# Patient Record
Sex: MALE | Race: WHITE
[De-identification: names, ages, dates, MRNs, and addresses within clinical notes are randomized per-mention and may not be internally consistent; named-entity substitution may affect disease eponyms.]

---

## 2019-01-08 ENCOUNTER — HOSPITAL ENCOUNTER (EMERGENCY)
Dept: HOSPITAL 97 - ER | Age: 74
Discharge: HOME | End: 2019-01-08
Payer: COMMERCIAL

## 2019-01-08 DIAGNOSIS — Z79.01: ICD-10-CM

## 2019-01-08 DIAGNOSIS — M25.532: ICD-10-CM

## 2019-01-08 DIAGNOSIS — Z86.73: ICD-10-CM

## 2019-01-08 DIAGNOSIS — M25.511: Primary | ICD-10-CM

## 2019-01-08 PROCEDURE — 99284 EMERGENCY DEPT VISIT MOD MDM: CPT

## 2019-01-08 NOTE — ER
Nurse's Notes                                                                                     

 [facility]                                                                

Name: Christiano Ruby                                                                             

Age: 73 yrs                                                                                       

Sex: Male                                                                                         

: 1945                                                                                   

MRN: [id number]                                                                                   

Arrival Date: 2019                                                                          

Time: 18:57                                                                                       

Account#: [id number]                                                                            

Bed 7                                                                                             

Private MD: Jack Cabral R                                                                       

Diagnosis: Pain in left wrist;Pain in right shoulder                                              

                                                                                                  

Presentation:                                                                                     

                                                                                             

18:59 Presenting complaint: Patient states: "I have been over active working and my left      jd3 

      wrist is hurting me. It is tender and swelling.". Transition of care: patient was not       

      received from another setting of care. Onset of symptoms was 2019. Risk         

      Assessment: Do you want to hurt yourself or someone else? Patient reports no desire to      

      harm self or others. Initial Sepsis Screen: Does the patient meet any 2 criteria? No.       

      Patient's initial sepsis screen is negative. Does the patient have a suspected source       

      of infection? No. Patient's initial sepsis screen is negative. Care prior to arrival:       

      None.                                                                                       

18:59 Method Of Arrival: Ambulatory                                                           jd3 

18:59 Acuity: MARK 4                                                                           jd3 

                                                                                                  

Historical:                                                                                       

- Allergies:                                                                                      

19:03 No Known Allergies;                                                                     jd3 

- Home Meds:                                                                                      

19:03 warfarin 10 mg Oral tab [Active]; Tramadol Oral [Active];                               jd3 

- PMHx:                                                                                           

19:03 CVA;                                                                                    jd3 

- PSHx:                                                                                           

19:03 right lung;                                                                             jd3 

                                                                                                  

- Immunization history:: Adult Immunizations up to date.                                          

- Social history:: Smoking status: Patient/guardian denies using tobacco.                         

- Ebola Screening: : Patient negative for fever greater than or equal to 101.5 degrees            

  Fahrenheit, and additional compatible Ebola Virus Disease symptoms.                             

                                                                                                  

                                                                                                  

Screenin:14 Abuse screen: Denies threats or abuse. Denies injuries from another. Nutritional        ca1 

      screening: No deficits noted. Tuberculosis screening: No symptoms or risk factors           

      identified. Fall Risk None identified.                                                      

                                                                                                  

Assessment:                                                                                       

19:14 General: Appears in no apparent distress. Behavior is calm, cooperative, appropriate    ca1 

      for age. Pain: Complains of pain in left wrist and right shoulder. Pain currently is 8      

      out of 10 on a pain scale. Neuro: Level of Consciousness is awake, alert, obeys             

      commands, Oriented to person, place, time, situation. Cardiovascular: Heart tones S1 S2     

      present Capillary refill < 3 seconds Patient's skin is warm and dry. Respiratory:           

      Airway is patent Trachea midline Respiratory effort is even, unlabored, Respiratory         

      pattern is regular, symmetrical, Breath sounds are clear bilaterally. GI: Abdomen is        

      flat, non-distended, Bowel sounds present X 4 quads. Abd is soft and non tender X 4         

      quads. : No signs and/or symptoms were reported regarding the genitourinary system.       

      EENT: No signs and/or symptoms were reported regarding the EENT system. Derm: Skin is       

      intact, Skin is pink, warm \T\ dry. Musculoskeletal: Circulation, motion, and sensation     

      intact. Capillary refill < 3 seconds.                                                       

20:03 Reassessment: Patient appears in no apparent distress at this time. Patient and/or      ca1 

      family updated on plan of care and expected duration. Pain level reassessed. Patient is     

      alert, oriented x 3, equal unlabored respirations, skin warm/dry/pink. X-ray at bedside.    

21:00 Reassessment: Patient appears in no apparent distress at this time. Patient and/or      ca1 

      family updated on plan of care and expected duration. Pain level reassessed. Patient is     

      alert, oriented x 3, equal unlabored respirations, skin warm/dry/pink. Ace wrap done.       

      Refused to put on the sling at this time. Said he'd drop by some place to eat, but will     

      put after with daughter's help. Showed him how to put the sling properly.                   

                                                                                                  

Vital Signs:                                                                                      

19:03  / 101; Pulse 99; Resp 18; Temp 97.1; Pulse Ox 98% ; Weight 99.79 kg; Height 6    jd3 

      ft. 2 in. (187.96 cm); Pain 7/10;                                                           

20:03  / 80; Pulse 87; Resp 18; Pulse Ox 98% on R/A;                                    ca1 

19:03 Body Mass Index 28.25 (99.79 kg, 187.96 cm)                                             jd3 

                                                                                                  

ED Course:                                                                                        

18:57 Patient arrived in ED.                                                                  rg4 

18:58 Jack Cabral MD is Private Physician.                                                  rg4 

19:00 Triage completed.                                                                       jd3 

19:04 Arm band placed on.                                                                     jd3 

19:13 Leslie Mckeon, RN is Primary Nurse.                                                      ca1 

19:14 Patient has correct armband on for positive identification. Bed in low position. Call   ca1 

      light in reach. Side rails up X 1. Pulse ox on. NIBP on.                                    

19:21 Rosanna Rivera FNP-C is PHCP.                                                        kb  

19:21 Derek Lew MD is Attending Physician.                                             kb  

20:16 X-ray completed. Portable x-ray completed in exam room. Patient tolerated procedure     bb2 

      well.                                                                                       

20:16 Shoulder Right (2 View) XRAY In Process Unspecified.                                    EDMS

20:16 Wrist Left (2 View) XRAY In Process Unspecified.                                        EDMS

21:00 No provider procedures requiring assistance completed. Ace wrap to left wrist by        LASHONDA Gasca.                                                                              

21:05 Patient did not have IV access during this emergency room visit.                        ca1 

                                                                                                  

Administered Medications:                                                                         

19:32 CANCELLED (Duplicate Order): Norco (7.5 mg-325 mg) 1 tabs PO once                       kb  

19:44 Drug: Norco (7.5 mg-325 mg) 1 tabs Route: PO;                                           ca1 

21:09 Follow up: Response: No adverse reaction; Pain is decreased                             ca1 

                                                                                                  

                                                                                                  

Outcome:                                                                                          

20:55 Discharge ordered by MD.                                                                kb  

21:05 Discharged to home ambulatory, with family.                                             ca1 

21:05 Condition: stable                                                                           

21:05 Discharge instructions given to patient, Instructed on discharge instructions, follow       

      up and referral plans. medication usage, Demonstrated understanding of instructions,        

      follow-up care, medications, Prescriptions given X 1.                                       

21:14 Patient left the ED.                                                                    ca1 

                                                                                                  

Signatures:                                                                                       

Dispatcher [employer]                           EDMS                                                 

Rosanna Rivera FNP-C FNP-Ckb Garcia, Rubi                                 rg4                                                  

Hardeep Vargas RN                    RN   jSanjuana Salter                               bb2                                                  

Leslie Mckeon RN                        RN   ca1                                                  

                                                                                                  

**************************************************************************************************

## 2019-01-08 NOTE — RAD REPORT
EXAM DESCRIPTION:  RAD - Wrist Left 2 View - 1/8/2019 8:16 pm

 

CLINICAL HISTORY:   Left wrist pain

 

FINDINGS:  No  fracture or dislocation is seen.

 

8 millimeter well-circumscribed lucency with a sclerotic border is present within the distal ulna whi
ch appears benign

## 2019-01-08 NOTE — EDPHYS
Physician Documentation                                                                           

 [facility]                                                                

Name: Christiano Ruby                                                                             

Age: 73 yrs                                                                                       

Sex: Male                                                                                         

: 1945                                                                                   

MRN: [id number]                                                                                   

Arrival Date: 2019                                                                          

Time: 18:57                                                                                       

Account#: [id number]                                                                            

Bed 7                                                                                             

Private MD: Jack Cabral R ED Physician Derek Lew                                                                      

HPI:                                                                                              

                                                                                             

20:25 This 73 yrs old  Male presents to ER via Ambulatory with complaints of Hand    kb  

      Swelling, Shoulder Pain.                                                                    

20:26 The patient or guardian reports pain, swelling, tenderness. The complaints affect the   kb  

      left wrist diffusely. Context: The problem was sustained at home, resulted from lifting     

      or pulling. Onset: The symptoms/episode began/occurred yesterday. Modifying factors:        

      The symptoms are alleviated by nothing, the symptoms are aggravated by nothing.             

      Associated signs and symptoms: The patient has no apparent associated signs or              

      symptoms. The patient has not experienced similar symptoms in the past. The patient has     

      not recently seen a physician. Pt reports left wrist and right shoulder pain that           

      started yesterday after moving furniture.                                                   

                                                                                                  

Historical:                                                                                       

- Allergies:                                                                                      

19:03 No Known Allergies;                                                                     jd3 

- Home Meds:                                                                                      

19:03 warfarin 10 mg Oral tab [Active]; Tramadol Oral [Active];                               jd3 

- PMHx:                                                                                           

19:03 CVA;                                                                                    jd3 

- PSHx:                                                                                           

19:03 right lung;                                                                             jd3 

                                                                                                  

- Immunization history:: Adult Immunizations up to date.                                          

- Social history:: Smoking status: Patient/guardian denies using tobacco.                         

- Ebola Screening: : Patient negative for fever greater than or equal to 101.5 degrees            

  Fahrenheit, and additional compatible Ebola Virus Disease symptoms.                             

                                                                                                  

                                                                                                  

ROS:                                                                                              

20:24 Constitutional: Negative for fever, chills, and weight loss, Cardiovascular: Negative   kb  

      for chest pain, palpitations, and edema, Respiratory: Negative for shortness of breath,     

      cough, wheezing, and pleuritic chest pain, Abdomen/GI: Negative for abdominal pain,         

      nausea, vomiting, diarrhea, and constipation, Skin: Negative for injury, rash, and          

      discoloration, Neuro: Negative for headache, weakness, numbness, tingling, and seizure.     

20:24 MS/extremity: Positive for pain, swelling, tenderness, of the anterior aspect of right      

      shoulder and left wrist.                                                                    

                                                                                                  

Exam:                                                                                             

20:24 Constitutional:  This is a well developed, well nourished patient who is awake, alert,  kb  

      and in no acute distress. Head/Face:  Normocephalic, atraumatic. Chest/axilla:  Normal      

      chest wall appearance and motion.  Nontender with no deformity.  No lesions are             

      appreciated. Cardiovascular:  Regular rate and rhythm with a normal S1 and S2.  No          

      gallops, murmurs, or rubs.  Normal PMI, no JVD.  No pulse deficits. Respiratory:  Lungs     

      have equal breath sounds bilaterally, clear to auscultation and percussion.  No rales,      

      rhonchi or wheezes noted.  No increased work of breathing, no retractions or nasal          

      flaring. Abdomen/GI:  Soft, non-tender, with normal bowel sounds.  No distension or         

      tympany.  No guarding or rebound.  No evidence of tenderness throughout. Skin:  Warm,       

      dry with normal turgor.  Normal color with no rashes, no lesions, and no evidence of        

      cellulitis. Neuro:  Awake and alert, GCS 15, oriented to person, place, time, and           

      situation.  Cranial nerves II-XII grossly intact.  Motor strength 5/5 in all                

      extremities.  Sensory grossly intact.  Cerebellar exam normal.  Normal gait.                

20:24 Musculoskeletal/extremity: Extremities: grossly normal except: noted in the anterior        

      aspect of right shoulder and left wrist: pain, swelling, tenderness, ROM: intact in all     

      extremities, Circulation is intact in all extremities. Sensation intact.                    

                                                                                                  

Vital Signs:                                                                                      

19:03  / 101; Pulse 99; Resp 18; Temp 97.1; Pulse Ox 98% ; Weight 99.79 kg; Height 6    jd3 

      ft. 2 in. (187.96 cm); Pain 7/10;                                                           

20:03  / 80; Pulse 87; Resp 18; Pulse Ox 98% on R/A;                                    ca1 

19:03 Body Mass Index 28.25 (99.79 kg, 187.96 cm)                                             jd3 

                                                                                                  

MDM:                                                                                              

19:21 Patient medically screened.                                                             kb  

20:25 Data reviewed: vital signs, nurses notes. Data interpreted: Pulse oximetry: on room air kb  

      is 98 %. Interpretation: normal.                                                            

20:51 Counseling: I had a detailed discussion with the patient and/or guardian regarding: the kb  

      historical points, exam findings, and any diagnostic results supporting the                 

      discharge/admit diagnosis, radiology results, the need for outpatient follow up, a          

      family practitioner, a orthopedic surgeon, to return to the emergency department if         

      symptoms worsen or persist or if there are any questions or concerns that arise at home.    

                                                                                                  

                                                                                             

19:30 Order name: Shoulder Right (2 View) XRAY; Complete Time: 20:51                          kb  

                                                                                             

19:30 Order name: Wrist Left (2 View) XRAY; Complete Time: 20:45                              kb  

                                                                                             

20:55 Order name: Sling; Complete Time: 21:09                                                 kb  

                                                                                             

20:55 Order name: Ace Wrap; Complete Time: 21:08                                              kb  

                                                                                                  

Administered Medications:                                                                         

19:32 CANCELLED (Duplicate Order): Norco (7.5 mg-325 mg) 1 tabs PO once                       kb  

19:44 Drug: Norco (7.5 mg-325 mg) 1 tabs Route: PO;                                           ca1 

21:09 Follow up: Response: No adverse reaction; Pain is decreased                             ca1 

                                                                                                  

                                                                                                  

Disposition:                                                                                      

                                                                                             

07:51 Co-signature as Attending Physician, Derek Lew MD I agree with the assessment and  harriett 

      plan of care.                                                                               

                                                                                                  

Disposition:                                                                                      

19 20:55 Discharged to Home. Impression: Pain in left wrist, Pain in right shoulder.        

- Condition is Stable.                                                                            

- Discharge Instructions: Shoulder Pain, Easy-to-Read, Wrist Pain, Easy-to-Read,                  

  Arthritis, Easy-to-Read.                                                                        

- Prescriptions for Tylenol- Codeine #3 300-30 mg Oral Tablet - take 2 tablets by ORAL            

  route every 6 hours As needed; 14 tablet.                                                       

- Medication Reconciliation Form, Thank You Letter, Antibiotic Education, Prescription            

  Opioid Use form.                                                                                

- Follow up: Emergency Department; When: As needed; Reason: Worsening of condition.               

  Follow up: Private Physician; When: 2 - 3 days; Reason: Recheck today's complaints,             

  Continuance of care, Re-evaluation by your physician.                                           

                                                                                                  

                                                                                                  

                                                                                                  

Signatures:                                                                                       

Dispatcher [employer]                           EDMS                                                 

Rosanna Rivera, FNP-C                 ERICP-Derek Higuera MD MD cha Davies, Jonathon, RN                    RN   Leslie Peterson RN                        RN   ca1                                                  

                                                                                                  

Corrections: (The following items were deleted from the chart)                                    

                                                                                             

19:32 19:32 Norco (7.5 mg-325 mg) 1 tabs PO once ordered. ca1                                 kb  

21:14 20:55 2019 20:55 Discharged to Home. Impression: Pain in left wrist; Pain in      ca1 

      right shoulder. Condition is Stable. Forms are Medication Reconciliation Form, Thank        

      You Letter, Antibiotic Education, Prescription Opioid Use. Follow up: Emergency             

      Department; When: As needed; Reason: Worsening of condition. Follow up: Private             

      Physician; When: 2 - 3 days; Reason: Recheck today's complaints, Continuance of care,       

      Re-evaluation by your physician. kb                                                         

                                                                                                  

**************************************************************************************************

## 2019-01-08 NOTE — RAD REPORT
EXAM DESCRIPTION:  RAD - Shoulder  Right 2 View - 1/8/2019 8:16 pm

 

CLINICAL HISTORY:  Right shoulder pain

 

FINDINGS:  No fracture or dislocation is seen. Moderate osteoarthritis involves the glenohumeral join
t consisting joint space narrowing and osteophytes.

 

Mild osteoarthritis involves the AC joint

## 2020-07-18 NOTE — XMS REPORT
Continuity of Care Document

                            Created on:2020



Patient:BRENDA BRADSHAW

Sex:Male

:1945

External Reference #:[id number]





Demographics







                          Address                   [address]



                                                    [place] [address]

 

                          Home Phone                [phone]

 

                          Email Address             [place]

 

                          Preferred Language        en

 

                          Marital Status            Unknown

 

                          Anabaptism Affiliation     Unknown

 

                          Race                      Unknown

 

                          Ethnic Group              Unknown









Author







                          Organization              [facility]

t

 

                          Address                   [address]



                                                    [place] [address]

 

                          Phone                     [phone]









Care Team Providers







                    Name                Role                Phone

 

                    Unavailable         Unavailable         Unavailable









Problems







       Condition Condition Condition Status Onset  Resolution Last   Treating Co

mments 

Source



       Name   Details Category        Date   Date   Treatment Clinician        



                                                 Date                 

 

       Cerebrovas Cerebrovas Problem Active                                    [facility]

[facility]



       cular  cular                                                   [facility]



       accident accident                                                  [facility]

a



       (CVA), (CVA),                                                  l



       unspecifie unspecifie                                                  Ou

tpati



       d      d                                                       ent



       mechanism mechanism                                                  Clin

ics

 

       Hemiparesi Hemiparesi Problem Active                                    C

[facility]



       s of right s of right                                                  Sarah

kes -



       dominant dominant                                                  [facility]

a



       side as side as                                                  l



       late   late                                                    [facility]pat[facility]



       effect of effect of                                                  ent



       cerebrovas cerebrovas                                                  Cl

inics



       cular  cular                                                   



       disease, disease,                                                  



       unspecifie unspecifie                                                  



       d      d                                                       



       cerebrovas cerebrovas                                                  



       cular  cular                                                   



       disease disease                                                  



       type   type                                                    

 

       Dermatocha Dermatocha Problem Active                                    C

[facility]



       lasis of lasis of                                                  [facility] 

-



       right  right                                                   [facility]



       upper  upper                                                   l



       eyelid eyelid                                                  [facility]



                                                                      ent



                                                                      Clinics

 

       Dermatocha Dermatocha Problem Active                                    C

[facility]



       lasis of lasis of                                                  [facility] 

-



       left upper left upper                                                  Me

moria



       eyelid eyelid                                                  l



                                                                      Outpati



                                                                      ent



                                                                      Clinics

 

       Essential Essential Problem Active                                    [facility]



       hypertensi hypertensi                                                  Sarah

[facility] -



       on     on                                                      [facility]



                                                                      [facility]



                                                                      [facility]

 

       Long-term Long-term Problem Active                                    [facility]



       (current) (current)                                                  [facility]

s -



       use of use of                                                  [facility]



       anticoagul anticoagul                                                  l



       ants, INR ants, INR                                                  Outp

ati



       goal   goal                                                    ent



       2.0-3.0 2.0-3.0                                                  Clinics

 

       Tobacco Tobacco Problem Active                                    [facility]



       use    use                                                     [facility]



       disorder, disorder,                                                  [facility]

arian



       continuous continuous                                                  l



                                                                      Outpati



                                                                      ent



                                                                      Clinics

 

       Primary Primary Problem Active                                    [facility]



       osteoarthr osteoarthr                                                  Sarah

kes -



       itis of itis of                                                  [facility]



       right knee right knee                                                  l



                                                                      Outpati



                                                                      ent



                                                                      Clinics







Allergies, Adverse Reactions, Alerts

This patient has no known allergies or adverse reactions.



Medications







       Ordered Filled Start  Stop   Current Ordering Indication Dosage Frequency

 Signature

                    Comments            Components          Source



     Medication Medication Date Date Medication? Clinician                (SIG) 

          



     Name Name                                                   

 

     Eliquis Eliquis       Yes  Markus                take 1 tab          

 [facility]



               5-18           Lowe                               [facility]



               00:00:                                              [facility]



               00                                                l



                                                                 Outpati



                                                                 ent



                                                                 Clinics

 

     Warfarin Warfarin           Yes  Markus                2 tablet           C

[facility]



     Sodium Sodium                Lowe                               [facility]



                                                                 [facility]



                                                                 l



                                                                 Outpati



                                                                 ent



                                                                 Clinics

 

     Tramadol Tramadol           Yes  Markus                1 tablet           C

[facility]



     HCl  HCl                 Lowe                as needed           [facility]



                                                                 [facility]



                                                                 l



                                                                 Outpati



                                                                 ent



                                                                 Clinics

 

     Lisinopril/ Lisinopril/           Yes  Markus                one tablet    

       [facility]



     HCTZ HCTZ                Lowe                               [facility]



                                                                 [facility]



                                                                 l



                                                                 Outpati



                                                                 ent



                                                                 Clinics

 

     Centrum Centrum           Yes  Markus                as             [facility]



     Silver Silver                Lowe                directed           [facility] 

-



                                                                 [facility]



                                                                 l



                                                                 Outpati



                                                                 ent



                                                                 Clinics

 

     Xyzal Xyzal           Yes  Markus                not            [facility]



                              Lowe                defined           [facility]



                                                                 [facility]



                                                                 l



                                                                 Outpati



                                                                 ent



                                                                 Clinics







Procedures

This patient has no known procedures.



Encounters







        Start   End     Encounter Admission Attending Care    Care    Encounter 

Source



        Date/Time Date/Time Type    Type    Clinicians Facility Department ID   

   

 

        2020 Outpatient                 [facility]

[id number] [facility]



        10:21:00 10:21:00                         [facility]



                                                Medicine                 Outpati



                                                                        ent



                                                                        Clinics

 

        2020 Outpatient                 [facility]

[id number] [facility]



        10:30:00 10:30:00                          [employer]         [facility]



                                                Medicine                 Outpati



                                                                        ent



                                                                        Clinics

 

        2020-04-10 2020-04-10 Outpatient                 [facility]

[id number] [facility]



        10:30:00 10:30:00                          [employer]         [facility]



                                                Medicine                 Outpati



                                                                        ent



                                                                        Clinics







Results

This patient has no known results.

## 2020-07-18 NOTE — XMS REPORT
[facility]

                             Created on:May 21, 2020



Patient:Christiano Ruby

Sex:Male

:1945

External Reference #:[id number]





Demographics







                          Address                   [address] [place] [id number]

 

                          Phone                     [phone]

 

                          Preferred Language        en

 

                          Marital Status            Unknown

 

                          [employer] Affiliation     Unknown

 

                          Race                      Unknown

 

                          Ethnic Group              Unknown









Author







                          Organization              [facility]









Care Team Providers







                    Name                Role                Phone

 

                    Markus Lowe       Provider Role       Unavailable









Allergies

No Known Allergies



Problems







             Problem Type Condition    Code         Onset Dates  Condition Statu

s

 

             Assessment   Long-term (current) use of Z79.01                    A

ctive



                          anticoagulants, INR goal 2.0-3.0                      

     

 

             Problem      Hemiparesis of right dominant side I69.951            

       Active



                          as late effect of cerebrovascular                     

      



                          disease, unspecified                           



                          cerebrovascular disease type                          

 

 

             Problem      Dermatochalasis of right upper H02.831                

   Active



                          eyelid                                 

 

             Problem      Dermatochalasis of left upper H02.834                 

  Active



                          eyelid                                 

 

             Problem      Essential hypertension I10                       Activ

e

 

             Problem      Long-term (current) use of Z79.01                    A

ctive



                          anticoagulants, INR goal 2.0-3.0                      

     

 

             Problem      Tobacco use disorder, continuous F17.209              

     Active

 

             Problem      Primary osteoarthritis of right M17.11                

    Active



                          knee                                   

 

             Problem      Cerebrovascular accident (CVA), I63.9                 

    Active



                          unspecified mechanism                           







Medications

No Known Medications



Results

No Known Results



Summary Purpose

[facility] Submission

## 2020-07-18 NOTE — ER
Nurse's Notes                                                                                     

 [facility]                                                                 

Name: Christiano Ruby                                                                             

Age: 74 yrs                                                                                       

Sex: Male                                                                                         

: 1945                                                                                   

MRN: [id number]                                                                                   

Arrival Date: 2020                                                                          

Time: 15:31                                                                                       

Account#: [id number]                                                                            

Bed Waiting                                                                                       

Private MD:                                                                                       

Diagnosis:                                                                                        

                                                                                                  

Presentation:                                                                                     

                                                                                             

15:31 Chief complaint: Patient states: heartburn x 2 days. Has tried Omeprazole OTC with no   sv  

      relief. Denies SOB or CP. Coronavirus screen: Patient denies a cough. Patient denies        

      shortness of breath or difficulty breathing. Patient denies measured and/or subjective      

      temperature greater than 100.4F prior to today's visit. Patient denies travel on a          

      cruise ship or to a country the [facility] currently lists as an affected area. Patient denies     

      contact with known and/or suspected case of COVID-19. Proceed with normal triage. Ebola     

      Screen: No symptoms or risks identified at this time. Risk Assessment: Do you want to       

      hurt yourself or someone else? Patient reports no desire to harm self or others. Onset      

      of symptoms was 2020.                                                              

15:31 Method Of Arrival: Wheelchair                                                           sv  

15:31 Acuity: MARK 4                                                                           sv  

15:33 Initial Sepsis Screen: Does the patient meet any 2 criteria? No. Patient's initial      sv  

      sepsis screen is negative. Does the patient have a suspected source of infection? No.       

      Patient's initial sepsis screen is negative.                                                

                                                                                                  

Historical:                                                                                       

- Allergies:                                                                                      

15:33 No Known Allergies;                                                                     sv  

- PMHx:                                                                                           

15:33 CVA;                                                                                    sv  

- PSHx:                                                                                           

15:33 right lung;                                                                             sv  

                                                                                                  

- Immunization history:: Adult Immunizations.                                                     

- Social history:: Smoking status: Patient reports the use of cigarette tobacco                   

  products, smokes one-half pack cigarettes per day.                                              

                                                                                                  

                                                                                                  

Vital Signs:                                                                                      

15:33  / 66; Pulse 68; Resp 20; Temp 99.2; Pulse Ox 99% ; Weight 104.33 kg; Height 6    sv  

      ft. 2 in. (187.96 cm);                                                                      

15:33 Body Mass Index 29.53 (104.33 kg, 187.96 cm)                                            sv  

                                                                                                  

ED Course:                                                                                        

15:31 Patient arrived in ED.                                                                  sv  

15:32 Triage completed.                                                                       sv  

15:33 Arm band placed on.                                                                     sv  

16:40 Patient's name was called from ER lobby. No response.                                   sv  

16:50 Patient's name was called from ER lobby. No response.                                   sv  

17:05 Patient's name was called from ER lobby. No response.                                   sv  

                                                                                                  

Administered Medications:                                                                         

No medications were administered                                                                  

                                                                                                  

                                                                                                  

Outcome:                                                                                          

17:26 Patient left the ED.                                                                    sv  

                                                                                                  

Signatures:                                                                                       

Adela Acevedo RN                    RN   sv                                                   

                                                                                                  

**************************************************************************************************

## 2020-07-18 NOTE — XMS REPORT
[facility]

                             Created on:May 18, 2020



Patient:Christiano Ruby

Sex:Male

:1945

External Reference #:[id number]





Demographics







                          Address                   [address] [place] [address]



                                                    [place] [address]

 

                          Phone                     [phone]

 

                          Preferred Language        en

 

                          Marital Status            Unknown

 

                          Shinto Affiliation     Unknown

 

                          Race                      Unknown

 

                          Ethnic Group              Unknown









Author







                          Organization              eClinicalWorks









Care Team Providers







                    Name                Role                Phone

 

                    [employer]       Provider Role       Unavailable









Allergies, Adverse Reactions, Alerts







                    Substance           Reaction            Event Type

 

                    N.K.D.A.            Info Not Available  Non Drug Allergy







Problems







             Problem Type Condition    Code         Onset Dates  Condition Statu

s

 

             Assessment   Cerebrovascular accident (CVA), I63.9                 

    Active



                          unspecified mechanism                           

 

             Problem      Hemiparesis of right dominant side I69.951            

       Active



                          as late effect of cerebrovascular                     

      



                          disease, unspecified                           



                          cerebrovascular disease type                          

 

 

             Problem      Dermatochalasis of right upper H02.831                

   Active



                          eyelid                                 

 

             Problem      Dermatochalasis of left upper H02.834                 

  Active



                          eyelid                                 

 

             Problem      Essential hypertension I10                       Activ

e

 

             Problem      Long-term (current) use of Z79.01                    A

ctive



                          anticoagulants, INR goal 2.0-3.0                      

     

 

             Problem      Tobacco use disorder, continuous F17.209              

     Active

 

             Problem      Primary osteoarthritis of right M17.11                

    Active



                          knee                                   

 

             Problem      Cerebrovascular accident (CVA), I63.9                 

    Active



                          unspecified mechanism                           

 

             Assessment   Chronic, continuous use of opioids F11.90             

       Active

 

             Assessment   Primary osteoarthritis of right M17.11                

    Active



                          knee                                   

 

             Assessment   Tobacco use disorder, continuous F17.209              

     Active

 

             Assessment   Hemiparesis of right dominant side I69.951            

       Active



                          as late effect of cerebrovascular                     

      



                          disease, unspecified                           



                          cerebrovascular disease type                          

 

 

             Assessment   Long-term (current) use of Z79.01                    A

ctive



                          anticoagulants, INR goal 2.0-3.0                      

     

 

             Assessment   Essential hypertension I10                       Activ

e







Medications







        Medication Code    Code    Instructions Start   End     Status  Dosage



                System                  Date    Date            

 

        Eliquis NDC     [id number] 5 MG Orally BID May 18,         Active  take

 1 tab



                                        2020                    

 

        Centrum Silver ND     [id number] - Orally                 Active  as d

irected

 

        Lisinopril/HCT NDC     [id number] 20/12.5 PO Q                 Active  

one tablet



        Z                       daily                           

 

        Tramadol HCl NDC     [id number] 50 MG Orally                 Active  1 

tablet as



                                BID PRN Pain                         needed

 

        Xyzal   NDC     0                               Active  not defined

 

        Warfarin NDC     [id number] 5 MG Orally                 Inactive 2 tabl

et



        Sodium                  Once a day                         







Results

No Known Results



Summary Purpose

eClinicalWorks Submission

## 2020-09-10 NOTE — R.PREADM
PRE-ADMISSION SCREENING FORM



SCREENING DATE AND TIME



2020 15:51 (CDT) 



ANTICIPATED REHAB ADMISSION DATE



09/10/2020 



REFERRING FACILITY



[facility] 



REFERRAL DATE AND TIME



2020 15:51 (CDT) 



ACUTE ADMIT DATE



2020 





Previous Rehabilitation(s):





No.



ACUTE /DC PLANNER



deepak 



ATTENDING PHYSICIAN



SOTO LONG 



REFERRING PHYSICIAN



DR.HASAM PARRA 



REHAB FACILITY



[facility] 



CLINICAL LIAISON



Naun De León 



PHYSICIAN REVIEWER



Dr. Michael Salinas M.D. 



MR#



[id number] 



ACCT#



[id number] 



NAME



CHRISTIANO RUBY



ADDRESS



[address] 



[facility] 



PHONE



[phone] 



[facility]



[id number] 



DATE OF BIRTH



1945 



AGE



74 



SSN#



[id number] 



GENDER



male 



MARITAL STATUS



 



RACE



unknown race 



ADMIT FROM



02 - [facility] 



PRE-HOSPITAL LIVING SETTING



01 - Home (private home/apt. board/care, assisted living, group home, transitional living) 



HOME TYPE AND DETAILS



Type of home: single family house

# of levels in the residence: 1

# of steps within the residence: 0

# of steps to enter the residence: 0



PRE-HOSPITAL LIVING WITH



Alone 



FAMILY SUPPORT



No 



PRIMARY FAMILY CONTACT NAME



VENESSA RUBY 



PRIMARY FAMILY CONTACT PHONE



[phone] 



PRIMARY FAMILY CONTACT RELATIONSHIP



Son 



PHONE PRIMARY FAMILY CONTACT ON ADM.?



no 



IS PRIMARY FAMILY CONTACT AUTH. REP.?



no 



1ST EMERGENCY CONTACT



VENESSA RUBY 



1ST CONTACT PHONE



[phone] 



1ST CONTACT RELATIONSHIP



Son 



PHONE 1ST CONTACT ON ADM.



no 



IS 1ST CONTACT AUTH. REP.?



no 



PHONE 2ND CONTACT ON ADM.?



no 



PATIENT EMPLOYMENT STATUS



Retired (for age) 



PATIENT EMPLOYER



No Employer 



PAYOR INFORMATION:



1ST PAYOR NAME



[facility] 



1ST PAYOR PHONE



[phone] 



1ST PAYOR POLICY ID



[id number] 



INJURY/ILLNESS DUE TO ACCIDENT?



No 



ANOTHER PARTY RESPONSIBLE?



No 



PRIMARY REHAB/ACUTE DIAGNOSIS:



STROKE



ONSET DATE



2020



REHAB IMPAIRMENT CATEGORY (CLARISSA):



01 Stroke (STR) MEETS 60% rule



AFFECTED EXTREMITIES:



RLE, and RUE



PRIMARY DIAGNOSIS-RELATED SURGERIES:



No surgeries related to the primary diagnosis were performed.



SUMMARY OF ACUTE HOSPITALIZATION:



Pt. is a 73 yo Right-handed male of unknown race.

On 2020 Pt. presented to [facility] with sudden onset of right-side weakness.

On 2020 he was admitted to [facility] with diagnosis STROKE.

His impairment category is Stroke 01 -  Right Body (Left Brain) (01.2).

Pre-morbidly, Pt. was independent/mod-I in Locomotion, Safety Awareness, Social Cognition, Balance, T
ransfers Control, Self-Care, and Communication; and he had good Sphincter Control and Endurance.

Currently, he has deficits of Locomotion, Balance, Transfers Control, Sphincter Control, and Enduranc
e.

Pt. is now referred to [facility] for acute in-patient rehabilitation in order
 to maximize patient's functional independence in activities of daily living, strength, ROM, and mobi
lity.

Patient has realistic goal of being discharged at assistance level 7-Ind to reside at Home with  Pt s
elf.

Christiano Ruby is a 74 year old male that lives independently

at home in his one story home. He has no stairs to get into his house. He was able

to do daily activated on his own with difficulty prior. He has a history of prior

CVA in  who presents with worsening of baseline right sided

weakness/numbness in leg/arm beginning on the morning of - he fell out of

his chair due to weakness. Previously able to ambulate without difficulty. Mr. Ruby has done all of his own caring, Cooking and laundry. He performs all his own ADLs and IA
DLs. Prior

to COVID he was seeing her PCP regularly. He would most definitely benefit from

acute inpatient rehab and has become severely debilitated and unable to live at

his prior level of activity at home Getting her stronger and better to be back

living at home independently is our goal. It is reasonable and necessary for

the patient to come to acute inpatient rehab for approximately 7-10 days in

order to return to his prior level of care. He is now being transferred to [facility] Inpatient rehabilitation and is medically stable with

relatively stable labs. He is now medically stable but in need of 24  hour

nursing, doctor supervision and oversight while receiving active and ongoing



participate in 3 hours of therapy a day/15 hours per week and receive care with

intensive interdisciplinary approach. COVID-19 screening performed; spoke with

patient via phone. Patient denies new onset of fever, cough, difficulty

breathing, sore throat, body aches and non-allergy nasal congestion in the past

24 hours. Patient denies travel outside of [place] in the past 14 days. Patient

denies any contact with someone who has a confirmed diagnosis of or is under

investigation for COVID-19 in the past 14 days. Patient has been tested negative

for COVID- 19.



PAST MEDICAL HISTORY



ACUTE CVA 2010

HTN

TABACCO

PMHx



MEDICATION ALLERGIES:



No Known Drug Allergies (NKDA)



ENVIRONMENTAL ALLERGIES:



- Substance Allergies

None Known

- Other Allergies

None Known



CODE STATUS:



Full code



WEIGHT/HEIGHT/BMI:



WEIGHT



215 lbs 



HEIGHT



6' 3" 



BMI



26.9 



DIET:



- Diet Type

Regular

- Diet - Solid Texture

Regular

- Diet - Liquid Texture

Regular

- Tube Feed

N/A



REVIEW OF SYSTEMS:



- Gen

Alert and awake

Lying in bed

No apparent distress

Oriented to: person, time, and place

- Vital Signs

Temperature: 97.0 F

SBP/DBP: 139/64

Pulse: 75

Resp: 20

Vital signs stable, afebrile

- CVS

RRR



VITAL SIGNS



Temperature: 97.0 F

SBP/DBP: 139/64

Pulse: 75

Resp: 20

Vital signs stable, afebrile



MEDICATIONS/TREATMENT:



Other-  See attached MAR (Medication Administration Record).



CURRENT SPHINCTER CONTROL:



Pre-hospital bladder status: unspecified

# of bladder accidents in the last 7 days prior to screenin

Pre-hospital bowel status: unspecified

# of bowel accidents in the last 7 days prior to screenin

Last Bowel Movement Date: 2020



CURRENT LOCOMOTION STATUS:



distance walked 0  feet



DETAILED CURRENT FUNCTIONAL STATUS:



- Bladder

accident frequency: Ind - No accidents in the past 7 days

- Bowel

accident frequency: Ind - No accidents in the past 7 days

- Walking

score based on distance walked: 0(N/A)

- Wheelchair

score based on distance traveled: 0(N/A)



QI SCORES:



- Self-Care

A. Eating  03-Partial/moderate assistance  

B. Oral hygiene  02-Substantial/maximal assistance  

C. Toileting hygiene  02-Substantial/maximal assistance  

E. Shower/bathe self  02-Substantial/maximal assistance  

F. Upper body dressing  02-Substantial/maximal assistance  

G. Lower body dressing  02-Substantial/maximal assistance  

H. Putting on/taking off footwear  88-Not attempted due to medical condition or safety concerns  

- Mobility

A. Roll left and right  03-Partial/moderate assistance  

B. Sit to lying  03-Partial/moderate assistance  

C. Lying to sitting on side of bed  03-Partial/moderate assistance  

D. Sit to stand  02-Substantial/maximal assistance  

E. Chair/bed-to-chair transfer  02-Substantial/maximal assistance  

F. Toilet transfer  88-Not attempted due to medical condition or safety concerns  

G. Car transfer  88-Not attempted due to medical condition or safety concerns  

I. Walk 10 feet  88-Not attempted due to medical condition or safety concerns  

J. Walk 50 feet with two turns  88-Not attempted due to medical condition or safety concerns  

K. Walk 150 feet  88-Not attempted due to medical condition or safety concerns  

L. Walking 10 feet on uneven surfaces  88-Not attempted due to medical condition or safety concerns  


M. 1 step (curb)  88-Not attempted due to medical condition or safety concerns  

N. 4 steps  88-Not attempted due to medical condition or safety concerns  

O. 12 steps  88-Not attempted due to medical condition or safety concerns  

P. Picking up object  88-Not attempted due to medical condition or safety concerns  

R. Wheel 50 feet with two turns  88-Not attempted due to medical condition or safety concerns  

S. Wheel 150 feet  88-Not attempted due to medical condition or safety concerns  

- Bladder and Bowel

Bladder continence      

Bowel continence      

- Endurance

Fair

- Balance

Fair

- Safety Awareness

Fair



CURRENT FUNC. DEFICITS:



Endurance, Balance, Self-Care, Safety Awareness, and Mobility



CURRENT / PREVIOUS ASSISTIVE DEVICES:



Hospital Bed

Rolling Walker

Wheelchair





HISTORY OF FALLS. HAS THE PATIENT HAD TWO OR MORE FALLS IN THE PAST YEAR OR ANY FALL WITH INJURY IN T
HE PAST YEAR?:



No 



PRIOR SURGERY. DID THE PATIENT HAVE MAJOR SURGERY DURING  DAYS PRIOR TO ADMISSION?:



No 



THERAPY NOTES FROM ACUTE CARE:



Attached.



SPECIAL NEEDS:



- Safety Concerns

Skin breakdown precautions needed due to skin breakdown risk



PATIENT NEEDS ACTIVE AND ONGOING THERAPEUTIC INTERVENTION OF MULTIPLE THERAPY DISCIPLINES, INCLUDING:




- Occupational Therapy

Cognitive Retraining. Visual Perceptual Training. 



- Dietary and Nutrition

Adequate Nutrition. Nutritional Education. Nutritional Supplements. 



- Speech Therapy

Cognitive Training. Expressive Language Skills. Memory Strategies. Receptive Language Skills. Speech 
Intelligibility Training. 



PATIENT NEEDS CLOSE MEDICAL SUPERVISION BY A REHABILITATION PHYSICIAN FOR:



Coordination of Treatment Team



PATIENT REQUIRES 24X7 REHAB NURSING FOR MEDICAL AND FUNCTIONAL MGT. OF THE FOLLOWING DEFICITS:



Disease Management

Medication Management

Patient/Family Education

Providing Safe Environment



PATIENT REQUIRES INTENSIVE, COORDINATED INTERDISCIPLINARY APPROACH TO REHAB:



Arranging Home Equipment/Services

Discharge Planning

Family Intervention/Training

/Case Management



PATIENT REHAB POTENTIAL:



MIGUELITO RUBY is able and expected to receive 3 hours of individualized therapy daily on at least 5 of e
very 7 days

MIGUELITO Crump prognosis for significant practical improvement within a reasonable period of time appea
rs Good

Expected level of measurable improvement will be of a practical value to MIGUELITO Crump functional capa
city or adaptations to impairments

Has a viable Discharge Plan

Medically appropriate; condition is sufficiently stable to participate in intensive rehab program



DISCHARGE PLAN:



- Estimated Length of Stay (days)

17. 



- Consensus on plan

Discharge plan has been discussed with primary caregiver. Patient/Family is in agreement with the filiberto
n. Primary caregiver is in agreement with the plan. 



- Patient/Family Goals

Return home independently. 



- Planned Living Setting Upon Discharge

Home, to live alone. Transitional Living. Primary caregiver: Pt self. 



RECOMMENDED CARE LEVEL:



IRF



RECOMMENDATION DETAILS:



Recommended Admission to Comprehensive Rehabilitation Program to Increase Functional [place]



SCREENER'S COMPLETENESS CONFIRMATION:



- Screening Confirmation

The patient data collection on this preadmission screening form is finished



PHYSICIANS REVIEW AND ADMISSION DETERMINATION



Admit - Based on my review of the Pre-Admission Screening results, in my medical judgment and experie
nce, I concur with the findings and recommend admission to [facility], as this
 patient requires an IRF level of care.



SIGNATURE PANEL:



 - [electronically] signed by Naun De León on 09/10/2020 at 15:45 (CDT)

 - [electronically] signed by Arlette Dick RN on 09/10/2020 at 15:52 (CDT)

Physician Reviewer - [electronically] signed by Dr. Michael Salinas M.D. on 09/10/2020 at 16:13 (CDT
)

## 2020-09-10 NOTE — XMS REPORT
[facility]

                           Created on:2020



Patient:Christiano Ruby

Sex:Male

:1945

External Reference #:[id number]





Demographics







                          Address                   [address] [place] [address]

 

                          Phone                     [phone]

 

                          Preferred Language        en

 

                          Marital Status            Unknown

 

                          Anabaptism Affiliation     Unknown

 

                          Race                      Unknown

 

                          Ethnic Group              Unknown









Author







                          Organization              eClinicalWorks









Care Team Providers







                    Name                Role                Phone

 

                    Markus Lowe       Provider Role       Unavailable









Allergies

No Known Allergies



Problems







             Problem Type Condition    Code         Onset Dates  Condition Statu

s

 

             Problem      Tobacco use disorder, continuous F17.209              

     Active

 

             Problem      Hemiparesis of right dominant side I69.951            

       Active



                          as late effect of cerebrovascular                     

      



                          disease, unspecified                           



                          cerebrovascular disease type                          

 

 

             Assessment   Cataract of right eye, unspecified H26.9              

       Active



                          cataract type                           

 

             Problem      Essential hypertension I10                       Activ

e

 

             Problem      Dermatochalasis of right upper H02.831                

   Active



                          eyelid                                 

 

             Problem      Cataract of right eye, unspecified H26.9              

       Active



                          cataract type                           

 

             Problem      Cerebrovascular accident (CVA), I63.9                 

    Active



                          unspecified mechanism                           

 

             Problem      Long-term (current) use of Z79.01                    A

ctive



                          anticoagulants, INR goal 2.0-3.0                      

     

 

             Problem      Dermatochalasis of left upper H02.834                 

  Active



                          eyelid                                 

 

             Problem      Primary osteoarthritis of right M17.11                

    Active



                          knee                                   







Medications

No Known Medications



Results

No Known Results



Summary Purpose

eClinicalWorks Submission

## 2020-09-10 NOTE — XMS REPORT
Continuity of Care Document

                          Created on:September 10, 2020



Patient:BRENDA BRADSHAW

Sex:Male

:1945

External Reference #:[id number]





Demographics







                          Address                   [address]



                                                    [place] [address]

 

                          Home Phone                [phone]

 

                          Email Address             [place]

 

                          Preferred Language        English

 

                          Marital Status            Unknown

 

                          Yarsani Affiliation     Unknown

 

                          Race                      Unknown

 

                          Additional Race(s)        Unavailable

 

                          Ethnic Group              Unknown









Author







                          Organization              [facility]

t

 

                          Address                   [address]



                                                    [place] [address]

 

                          Phone                     [phone]









Care Team Providers







                    Name                Role                Phone

 

                    Unavailable         Unavailable         Unavailable









Problems







       Condition Condition Condition Status Onset  Resolution Last   Treating Co

mments 

Source



       Name   Details Category        Date   Date   Treatment Clinician        



                                                 Date                 

 

       Cerebrovas Cerebrovas Problem Active                                    [facility]

[facility]



       cular  cular                                                   [facility]



       accident accident                                                  [facility]

a



       (CVA), (CVA),                                                  l



       unspecifie unspecifie                                                  Ou

tpati



       d      d                                                       ent



       mechanism mechanism                                                  Clin

ics

 

       Hemiparesi Hemiparesi Problem Active                                    C

[facility]



       s of right s of right                                                  Sarah

[facility] -



       dominant dominant                                                  [facility]

a



       side as side as                                                  l



       late   late                                                    Outpati



       effect of effect of                                                  ent



       cerebrovas cerebrovas                                                  Cl

inics



       cular  cular                                                   



       disease, disease,                                                  



       unspecifie unspecifie                                                  



       d      d                                                       



       cerebrovas cerebrovas                                                  



       cular  cular                                                   



       disease disease                                                  



       type   type                                                    

 

       Dermatocha Dermatocha Problem Active                                    C

[facility]



       lasis of lasis of                                                  [facility] 

-



       right  right                                                   [facility]



       upper  upper                                                   l



       eyelid eyelid                                                  Outpati



                                                                      ent



                                                                      Clinics

 

       Dermatocha Dermatocha Problem Active                                    C

[facility]



       lasis of lasis of                                                  [facility] 

-



       left upper left upper                                                  Me

moria



       eyelid eyelid                                                  l



                                                                      Outpati



                                                                      ent



                                                                      Clinics

 

       Essential Essential Problem Active                                    [facility]



       hypertensi hypertensi                                                  Sarah

[facility] -



       on     on                                                      [facility]



                                                                      l



                                                                      Outpati



                                                                      ent



                                                                      Clinics

 

       Long-term Long-term Problem Active                                    [facility]



       (current) (current)                                                  Luke

s -



       use of use of                                                  [facility]



       anticoagul anticoagul                                                  l



       ants, INR ants, INR                                                  Outp

ati



       goal   goal                                                    ent



       2.0-3.0 2.0-3.0                                                  Clinics

 

       Tobacco Tobacco Problem Active                                    [facility]



       use    use                                                     [facility]



       disorder, disorder,                                                  Varun

arian



       continuous continuous                                                  l



                                                                      Outpati



                                                                      ent



                                                                      Clinics

 

       Primary Primary Problem Active                                    [facility]



       osteoarthr osteoarthr                                                  Sarah

[facility] -



       itis of itis of                                                  [facility]



       right knee right knee                                                  l



                                                                      Outpati



                                                                      ent



                                                                      Clinics

 

       Cataract Cataract Problem Active                                    [facility]

t



       of right of right                                                  [facility] 

-



       eye,   eye,                                                    [facility]



       unspecifie unspecifie                                                  l



       d cataract d cataract                                                  Ou

tpati



       type   type                                                    ent



                                                                      Clinics







Allergies, Adverse Reactions, Alerts

This patient has no known allergies or adverse reactions.



Medications







       Ordered Filled Start  Stop   Current Ordering Indication Dosage Frequency

 Signature

                    Comments            Components          Source



     Medication Medication Date Date Medication? Clinician                (SIG) 

          



     Name Name                                                   

 

     Eliquis Eliquis       Yes  Markus                take 1 tab          

 [facility]



               5-18           Lowe                               [facility]



               00:00:                                              [facility]



               00                                                l



                                                                 Outpati



                                                                 ent



                                                                 Clinics

 

     Warfarin Warfarin           Yes  Markus                2 tablet           C

[facility]



     Sodium Sodium                Lowe                               [facility]



                                                                 [facility]



                                                                 l



                                                                 Outpati



                                                                 ent



                                                                 Clinics

 

     Tramadol Tramadol           Yes  Markus                1 tablet           C

[facility]



     HCl  HCl                 Lowe                as needed           [facility]



                                                                 [facility]



                                                                 l



                                                                 Outpati



                                                                 ent



                                                                 Clinics

 

     Lisinopril/ Lisinopril/           Yes  Markus                one tablet    

       [facility]



     HCTZ HCTZ                [facility]                               [facility]



                                                                 l



                                                                 Outpati



                                                                 ent



                                                                 Clinics

 

     Centrum Centrum           Yes  Markus                as             [facility]



     Silver Silver                [facility]                directed           [facility] 

-



                                                                 [facility]



                                                                 l



                                                                 Outpati



                                                                 ent



                                                                 Clinics

 

     Xyzal Xyzal           Yes  Markus                not            [facility]



                              [facility]                defined           [facility] -



                                                                 [facility]



                                                                 [facility]



                                                                 [facility]



                                                                 [facility]







Procedures

This patient has no known procedures.



Encounters







        Start   End     Encounter Admission Attending Care    Care    Encounter 

Source



        Date/Time Date/Time Type    Type    Clinicians Facility Department ID   

   

 

        2020 Outpatient                 [facility]

[id number] [facility]



        13:38:00 13:38:00                         [facility]



                                                Medicine                 Outpati



                                                                        ent



                                                                        Clinics

 

        2020 Outpatient                 [facility]

[id number] [facility]



        10:21:00 10:21:00                         [facility]



                                                Medicine                 Outpati



                                                                        ent



                                                                        Clinics

 

        2020 Outpatient                 [facility]

[id number] [facility]



        10:30:00 10:30:00                         [facility]   [facility]



                                                Medicine                 Outpati



                                                                        ent



                                                                        Clinics

 

        2020-04-10 2020-04-10 Outpatient                 [facility]

[id number] [facility]



        10:30:00 10:30:00                         [facility]



                                                Medicine                 Outpati



                                                                        ent



                                                                        Clinics







Results

This patient has no known results.

## 2020-09-11 NOTE — P.RH.PN
Estimated Length of Stay: 14


Expected Discharge Date: 09/25/20


Discharge Disposition Plan: Skilled Nursing Facility


Family Support: Yes


Vital Signs: 


                                Last Vital Signs











Temp  97.5 F   09/11/20 07:35


 


Pulse  63   09/11/20 07:44


 


Resp  16   09/11/20 07:35


 


BP  135/49 L  09/11/20 07:44


 


Pulse Ox  95   09/11/20 07:35











Laboratory: 


                             Laboratory Last Values











WBC  8.7 K/uL (4.3-10.9)   09/11/20  06:00    


 


RBC  4.60 M/uL (4.33-5.43)   09/11/20  06:00    


 


Hgb  13.2 g/dL (13.6-17.9)  L  09/11/20  06:00    


 


Hct  38.3 % (39.6-49.0)  L  09/11/20  06:00    


 


MCV  83.2 fL ()  D 09/11/20  06:00    


 


MCH  28.8 pg (27.0-35.0)   09/11/20  06:00    


 


MCHC  34.6 g/dL (32.0-36.0)   09/11/20  06:00    


 


RDW  14.5 % (12.1-15.2)   09/11/20  06:00    


 


Plt Count  76 K/uL (152-406)  L  09/11/20  06:00    


 


MPV  9.0 fL (7.6-11.3)   09/11/20  06:00    


 


Neutrophils %  71.2 % (41.7-73.7)   09/11/20  06:00    


 


Lymphocytes %  18.9 % (15.3-44.8)   09/11/20  06:00    


 


Monocytes %  9.1 % (3.3-12.3)   09/11/20  06:00    


 


Eosinophils %  0.4 % (0-4.4)   09/11/20  06:00    


 


Basophils %  0.4 % (0-1.3)   09/11/20  06:00    


 


Absolute Neutrophils  6.2 K/uL (1.8-8.0)   09/11/20  06:00    


 


Absolute Lymphocytes  1.6 K/uL (0.7-4.9)   09/11/20  06:00    


 


Absolute Monocytes  0.8 K/uL (0.1-1.3)   09/11/20  06:00    


 


Absolute Eosinophils  0.0 K/uL (0-0.5)   09/11/20  06:00    


 


Absolute Basophils  0.0 K/uL (0-0.5)   09/11/20  06:00    


 


Platelet Estimate  Decr   09/11/20  06:00    


 


Giant Platelets  Present   09/11/20  06:00    


 


Morphology Comment  Not seen  (NOT SEEN)   09/11/20  06:00    


 


PT  12.4 SECONDS (9.5-12.5)   09/11/20  06:00    


 


INR  1.05   09/11/20  06:00    


 


Sodium  139 mmol/L (136-145)   09/11/20  06:00    


 


Potassium  4.1 mmol/L (3.5-5.1)   09/11/20  06:00    


 


Chloride  108 mmol/L ()  H  09/11/20  06:00    


 


Carbon Dioxide  28 mmol/L (21-32)   09/11/20  06:00    


 


BUN  32 mg/dL (7-18)  H  09/11/20  06:00    


 


Creatinine  1.17 mg/dL (0.55-1.3)   09/11/20  06:00    


 


Estimated GFR  61 mL/min (=/>90)  L  09/11/20  06:00    


 


Glucose  143 mg/dL ()  H  09/11/20  06:00    


 


POC Glucose  145 mg/dL ()  H  09/11/20  06:46    


 


Calcium  8.4 mg/dL (8.5-10.1)  L  09/11/20  06:00    


 


Magnesium  2.3 mg/dL (1.8-2.4)   09/11/20  06:00    


 


Albumin  2.7 g/dL (3.4-5.0)  L  09/11/20  06:00    


 


Prealbumin  27.3 mg/dL (20-40)   09/11/20  06:00    


 


Urine Color  Yellow   09/10/20  22:25    


 


Urine Appearance  Clear   09/10/20  22:25    


 


Urine pH  6.0  (5.0-7.0)   09/10/20  22:25    


 


Ur Specific Gravity  >=1.030  (1.005-1.030)   09/10/20  22:25    


 


Glucose (UA)(Auto)  1+  (NEG)  H  09/10/20  22:25    


 


Urine Ketones  Negative  (NEG)   09/10/20  22:25    


 


Urine Blood  Negative  (NEG)   09/10/20  22:25    


 


Urine Nitrite  Negative  (NEG)   09/10/20  22:25    


 


Urine Bilirubin  Negative  (NEG)   09/10/20  22:25    


 


Urine Urobilinogen  1.0 mg/dL (0.2-1.0)   09/10/20  22:25    


 


Ur Leukocyte Esterase  Negative  (NEG)   09/10/20  22:25    


 


Urine RBC  <5 /HPF (NONE SEEN)   09/10/20  22:25    


 


Urine WBC  <5 /HPF (<5)   09/10/20  22:25    


 


Ur Squamous Epith Cells  5-10 /HPF (NONE SEEN)  H  09/10/20  22:25    


 


Calcium Oxalate Crystal  Many  (NONE SEEN)  H  09/10/20  22:25    


 


Urine Bacteria  <20 /HPF (NONE SEEN)   09/10/20  22:25    


 


Urine Culture Reflexed  Not needed   09/10/20  22:25    


 


Urine Total Protein  Negative  (NEG)   09/10/20  22:25    


 


Smear Scan  Ok  (OK)   09/11/20  06:00    











Weight: 220 lb


Physician Update: Max assistance with ADLs. Max assistance to transfer. His 

right leg is 0/5. His right arm is 4/5. He must use the [employer] for transfers 

due to hard leaning to the right with standing. His labs were reviewed and are 

stable. He will be reviewed by speech therapy. He is incontinent of bowel and 

bladder function.


Summary: Patient's care plan and long term goals have been reviewed and revised 

as necessary. Please see the Rehabilitation Signature page for all necessary 

signatures.

## 2020-09-11 NOTE — R.HP
HISTORY AND PHYSICAL



FACILITY:



[facility]



ENCOUNTER DATE AND TIME:



09/11/2020 13:25 (CDT)



MR#:



[id number]



ACCT#:



[id number]



NAME



BRENDA RUBY



ADDRESS:



[address]



CITY:



[place]



ZIP



[address]



PHONE:



[phone]



YOB: 1945



AGE:



74



SSN#



[id number]



GENDER:



Male



DEXTERITY



Right-handed



MARITAL STATUS







RACE



Unknown race



PRE-HOSPITAL LIVING SETTING



01 - Home (private home/apt. board/care, assisted living, group home, transitional living) 



PRE-HOSPITAL LIVING WITH



Alone 



ENCOUNTER PHYSICIAN:



Dr. Michael Salinas M.D.



REFERRING DOCTOR:



DR.HASAM PARRA



DATE OF ADMISSION:



09/10/2020 18:41 (CDT)



REFERRING FACILITY



[facility] 



HOME TYPE AND DETAILS:



Type of home: single family house

# of levels in the residence: 1

# of steps within the residence: 0

# of steps to enter the residence: 0



ONSET DATE:



08/31/2020



PRIMARY DIAGNOSIS-RELATED SURGERIES:



No surgeries related to the primary diagnosis were performed.



HISTORY OF PRESENT ILLNESS (HPI):



Currently, he has deficits of Locomotion, Balance, Transfers Control, Sphincter Control, and Enduranc
e.

On 08/31/2020 Pt. presented to [facility] with sudden onset of right-side weakness.

On 08/31/2020 he was admitted to [facility] with diagnosis Left hemispheric STROKE.

His impairment category is Stroke 01 -  Right Body (Left Brain) (01.2).

Pre-morbidly, Pt. was independent/mod-I in Locomotion, Safety Awareness, Social Cognition, Balance, T
ransfers Control, Self-Care, and Communication; and he had good Sphincter Control and Endurance.

Pt. is now referred to [facility] for acute in-patient rehabilitation in order
 to maximize patient's functional independence in activities of daily living, strength, ROM, and mobi
lity.

Pt. is a 73 yo Right-handed male of unknown race.

Patient has realistic goal of being discharged at assistance level 7-Ind to reside at Home with  Pt s
elf.

Brenda Ruby is a 74 year old male that lives independently

at home in his one story home. He has no stairs to get into his house. He was able

to do daily activated on his own with difficulty prior. He has a history of prior

CVA in 2010 who presents with worsening of baseline right sided

weakness/numbness in leg/arm beginning on the morning of 8/31- he fell out of

his chair due to weakness. Previously able to ambulate without difficulty. Mr. Ruby has done all of his own caring, Cooking and laundry. He performs all his own ADLs and IA
DLs. Prior

to COVID he was seeing her PCP regularly. He would most definitely benefit from

acute inpatient rehab and has become severely debilitated and unable to live at

his prior level of activity at home Getting her stronger and better to be back

living at home independently is our goal. It is reasonable and necessary for

the patient to come to acute inpatient rehab for approximately 7-10 days in

order to return to his prior level of care. He is now being transferred to [facility] Inpatient rehabilitation and is medically stable with

relatively stable labs. He is now medically stable but in need of 24  hour

nursing, doctor supervision and oversight while receiving active and ongoing



participate in 3 hours of therapy a day/15 hours per week and receive care with

intensive interdisciplinary approach. COVID-19 screening performed; spoke with

patient via phone. Patient denies new onset of fever, cough, difficulty

breathing, sore throat, body aches and non-allergy nasal congestion in the past

24 hours. Patient denies travel outside of [place] in the past 14 days. Patient

denies any contact with someone who has a confirmed diagnosis of or is under

investigation for COVID-19 in the past 14 days. Patient has been tested negative

for COVID- 19.



MEDICATION ALLERGIES:



No Known Drug Allergies (NKDA)



ENVIRONMENTAL ALLERGIES:



- Substance Allergies

None Known

- Other Allergies

None Known



PAST MEDICAL HISTORY:



ACUTE CVA 2010

HTN

PMHx

TABACCO



SOCIAL HISTORY:



- Home Living

Alone



REVIEW OF SYSTEMS:



- Gen

No Chills

Fatigue

No Fever

- Eyes

No Double Vision

No itchiness

- ENMT

No Difficulty Swallowing

- CVS

No Chest Discomfort

No Chest Pain

Fatigue

No Weight Gain

- Resp

No Cough

No Shortness of Breath

- GI

Continent

No Abdominal Pain

No Constipation

No Diarrhea

- 

Continent

No Kidney Pain

No Painful Urination

No Urinary Urgency

- MSK

No Joint Pain

Muscle Cramps

Stiffness

- Skin

No Itching

No Rash

No Suspicious Lesions

- Neuro

Coordination Difficulty

No Difficulty with Concentration

No Memory Loss

No Seizures

Weakness

- Psych

No Anxiety

No Depression



No HIV Exposure

No Persistent Infections

No Seasonal Allergies

- Endo

No Cold/Heat Intolerance

No Excessive Hunger

No Excessive Thirst

No Excessive Urination



PHYSICAL EXAM



- Gen

Alert and awake

Lying in bed

No apparent distress

Oriented to: person, time, and place

- Skin

No breakdown



No abnormalities

- Eyes

No abnormalities

- ENMT

No abnormalities

- Neck

No abnormalities

- CVS

RRR

- Chest

No abnormalities

- Abd

Soft

- GI

Non distended



Deferred

- 

No abnormalities

- Ext

Mild right lower extremity edema.

- MSK

0/5 weakness in right lower extremity, 4/5 right upper extremity strength.

- Neuro

0/5 weakness in right lower extremity, 4/5 right upper extremity strength.

- Psych

No abnormalities



VITAL SIGNS



Temperature: 97.5 F

SBP/DBP: 135/49

Pulse: 63

Resp: 16



NURSING:



- Shower

allowing shower

- Bladder

care per protocol

- Skin

care per protocol



PRECAUTIONS:



- Weight Bearing Precaution

WBAT right LE



ACTIVITIES



OOB only with supervision



QI SCORES:



- Self-Care

A. Eating  03-Partial/moderate assistance  

B. Oral hygiene  02-Substantial/maximal assistance  

C. Toileting hygiene  02-Substantial/maximal assistance  

E. Shower/bathe self  02-Substantial/maximal assistance  

F. Upper body dressing  02-Substantial/maximal assistance  

G. Lower body dressing  02-Substantial/maximal assistance  

H. Putting on/taking off footwear  88-Not attempted due to medical condition or safety concerns  

- Mobility

M. 1 step (curb)  88-Not attempted due to medical condition or safety concerns  

N. 4 steps  88-Not attempted due to medical condition or safety concerns  

O. 12 steps  88-Not attempted due to medical condition or safety concerns  

P. Picking up object  88-Not attempted due to medical condition or safety concerns  

R. Wheel 50 feet with two turns  88-Not attempted due to medical condition or safety concerns  

A. Roll left and right  03-Partial/moderate assistance  

B. Sit to lying  03-Partial/moderate assistance  

C. Lying to sitting on side of bed  03-Partial/moderate assistance  

D. Sit to stand  02-Substantial/maximal assistance  

E. Chair/bed-to-chair transfer  02-Substantial/maximal assistance  

F. Toilet transfer  88-Not attempted due to medical condition or safety concerns  

G. Car transfer  88-Not attempted due to medical condition or safety concerns  

I. Walk 10 feet  88-Not attempted due to medical condition or safety concerns  

J. Walk 50 feet with two turns  88-Not attempted due to medical condition or safety concerns  

K. Walk 150 feet  88-Not attempted due to medical condition or safety concerns  

L. Walking 10 feet on uneven surfaces  88-Not attempted due to medical condition or safety concerns  


S. Wheel 150 feet  88-Not attempted due to medical condition or safety concerns  

- Bladder and Bowel

Bladder continence      

Bowel continence      

- Endurance

Fair

- Balance

Fair

- Safety Awareness

Fair



CURRENT FUNC. DEFICITS:



Endurance, Balance, Self-Care, Safety Awareness, and Mobility



MEDICATIONS:



- Other

See attached MAR (Medication Administration Record)



ASSESSMENT:



Currently, he has deficits of Locomotion, Balance, Transfers Control, Sphincter Control, and Enduranc
e.On 08/31/2020 Pt. presented to [facility] with sudden onset of right-side weakness.On 08/31/202
0 he was admitted to [facility] with diagnosis Left hemispheric STROKE.His impairment category is
 Stroke 01 -  Right Body (Left Brain) (01.2).Pt. is now referred to [facility]
 for acute in-patient rehabilitation in order to maximize patient's functional independence in activi
ties of daily living, strength, ROM, and mobility.- Rehab Goal

Patient has realistic goal of being discharged at assistance level 7-Ind to reside at Home with  Pt s
elf.

Pre-morbidly, Pt. was independent/mod-I in Locomotion, Safety Awareness, Social Cognition, Balance, T
ransfers Control, Self-Care, and Communication; and he had good Sphincter Control and Endurance.Pt. i
s a 73 yo Right-handed male of unknown race.Brenda Ruby is a 74 year old male that lives independ
ently

at home in his one story home. He has no stairs to get into his house. He was able

to do daily activated on his own with difficulty prior. He has a history of prior

CVA in 2010 who presents with worsening of baseline right sided

weakness/numbness in leg/arm beginning on the morning of 8/31- he fell out of

his chair due to weakness. Previously able to ambulate without difficulty. Mr. Ruby has done all of his own caring, Cooking and laundry. He performs all his own ADLs and IA
DLs. Prior

to COVID he was seeing her PCP regularly. He would most definitely benefit from

acute inpatient rehab and has become severely debilitated and unable to live at

his prior level of activity at home Getting her stronger and better to be back

living at home independently is our goal. It is reasonable and necessary for

the patient to come to acute inpatient rehab for approximately 7-10 days in

order to return to his prior level of care. He is now being transferred to [facility] Inpatient rehabilitation and is medically stable with

relatively stable labs. He is now medically stable but in need of 24  hour

nursing, doctor supervision and oversight while receiving active and ongoing



participate in 3 hours of therapy a day/15 hours per week and receive care with

intensive interdisciplinary approach. COVID-19 screening performed; spoke with

patient via phone. Patient denies new onset of fever, cough, difficulty

breathing, sore throat, body aches and non-allergy nasal congestion in the past

24 hours. Patient denies travel outside of [place] in the past 14 days. Patient

denies any contact with someone who has a confirmed diagnosis of or is under

investigation for COVID-19 in the past 14 days. Patient has been tested negative

for COVID- 19.REHAB PLAN:





for Dementia, TBI, Stroke, or others

- Physical Therapy

 Weakness - to improve, our physical therapists will perform initial evaluation of pt's status upon a
dmission and devise an individualized program for Aquatic Therapy, Neuromuscular Reeducation, and Str
engthening

 Poor balance - to improve, our physical therapists will perform initial evaluation of pt's status up
on admission and devise an individualized program for Balance Training

 Inability to transfer - to improve, our physical therapists will perform initial evaluation of pt's 
status upon admission and devise an individualized program for Bed mobility

 Need in caregiver upon discharge - to improve, our physical therapists will perform initial evaluati
on of pt's status upon admission and devise an individualized program for Caregiver Training

 Poor endurance - to improve, our physical therapists will perform initial evaluation of pt's status 
upon admission and devise an individualized program for Endurance Training

 Gait dysfunction - to improve, our physical therapists will perform initial evaluation of pt's statu
s upon admission and devise an individualized program for Gait Training, and Wheel Chair mobility

 Need for home safety evaluation - to improve, our physical therapists will perform initial evaluatio
n of pt's status upon admission and devise an individualized program for Home Evaluation

 New precaution - to improve, our physical therapists will perform initial evaluation of pt's status 
upon admission and devise an individualized program for Patient precaution education

 Edema - to improve, our physical therapists will perform initial evaluation of pt's status upon admi
ssion and devise an individualized program for Elevation Training, and Lymphedema Therapy

- Occupational Therapy

 Weakness - to improve, our occupation therapists will perform initial evaluation of pt's status upon
 admission and devise an individualized program for Aquatic Therapy, Balance, Endurance, UE ROM, and 
UE strengthening

 Need for care giver - to improve, our occupation therapists will perform initial evaluation of pt's 
status upon admission and devise an individualized program for Caregiver Training

- Balance

for Weakness

- Bed mobility

for ADL deficits

- Cognition - orientation

 for aphasia

- Dressing

 Status: dep

for ADL deficits

- Eating

for ADL deficits

- Grooming

 Status: min

for ADL deficits

- Toilet Transfer

 Status: dep

for ADL deficits

- Bed to Chair Transfer squat pivot transfer

 Status: dep

for ADL deficits

- Tub Transfer

for ADL deficits

 Status: dep

- Hygiene

for ADL deficits

- Shower Transfer

for ADL deficits

 Status: dep

- Wheel Chair to Bed Transfer

 Status: dep

for ADL deficits



MEDICAL PLAN:



- Diet Type

Regular

- Diet - Liquid Texture

Regular

- Tube Feed

N/A

- Bladder

care per protocol

- Weight Bearing Precaution

WBAT  LE

- Skin

care per protocol

- Other

See attached MAR (Medication Administration Record)

- Diet - Solid Texture

Regular

- Shower

 shower



DISCHARGE PLAN:



- Estimated Length of Stay (days)

17. 



- Consensus on plan

Discharge plan has been discussed with primary caregiver. Patient/Family is in agreement with the filiberto
n. Primary caregiver is in agreement with the plan. 



- Patient/Family Goals

Return home independently. 



- Planned Living Setting Upon Discharge

Home, to live alone. Transitional Living. Primary caregiver: Pt self. 



SIGNATURE PANEL:



Electronically signed by Dr. Michael Salinas M.D. on 09/11/2020 at 13:37 (CDT)

## 2020-09-11 NOTE — PAPE
POST ADMISSION PHYSICIAN EVALUATION



PATIENT:



[facility] 



MR#



[id number] 



ACCT#



[id number] 



REFERRING DOCTOR



DR.HASAM PARRA



EVALUATION DATE AND TIME



09/11/2020 13:38 (CDT) 



NAME



BRENDA BRADSHAW



DATE OF BIRTH



1945 



AGE



74 



PHONE



[phone] 



SSN#



[id number] 



GENDER



male 



EVALUATING PHYSICIAN



Dr. Michael Salinas M.D. 



ADMISSION DIAGNOSIS:



Left hemispheric STROKE



ONSET DATE



08/31/2020



POST-ADMISSION FUNCTIONAL/MEDICAL STATUS:



- Bladder

Same accident frequency: Ind - No accidents in the past 7 days

- Bowel

Same accident frequency: Ind - No accidents in the past 7 days

- Walking

Same score based on distance walked: 0(N/A)

- Wheelchair

Same score based on distance traveled: 0(N/A)



STATUS CHANGE EVALUATION:



No change in Functional or Medical Status is identified compared with Pre-Admission screening.



PATIENT NEEDS CLOSE MEDICAL SUPERVISION BY A REHABILITATION PHYSICIAN FOR:



Coordination of Treatment Team



PATIENT REQUIRES 24X7 REHAB NURSING FOR MEDICAL AND FUNCTIONAL MGT. OF THE FOLLOWING DEFICITS:



Disease Management

Medication Management

Patient/Family Education

Providing Safe Environment



PATIENT REQUIRES INTENSIVE, COORDINATED INTERDISCIPLINARY APPROACH TO REHAB:



Arranging Home Equipment/Services

Discharge Planning

Family Intervention/Training

/Case Management



LIST OF IDENTIFIED AND POTENTIAL PROBLEMS:



Alteration in leisure activities

Bladder, Incontinence

Bowel, Incontinence

Infection, Actual or Potential

Mobility Impaired

Pain, Alteration in Comfort

Self Care Deficit

Skin Integrity, Actual or Potential

Urinary Tract Infection (UTI), Actual or Potential



PATIENT COULD BE AT RISK FOR COMPLICATIONS FROM ADVERSE MEDICAL CONDITIONS DUE TO HIS/HER COMORBIDITI
ES AND THE RIGORS OF THE INTENSIVE REHABILLITATION PROGRAM. METHODS OR INTERVENTIONS TO AVOID COMPLIC
ATIONS INCLUDE:



- Bleeding

Stroke patients assessed for lethargy or change in status. 



- Infection

Clinical staff to assess and manage the signs and symptoms of infection including fever, redness, war
mth, etc. 



- Urinary Tract Infection





- Aspiration

Clinical staff will assess and manage coughing, drooling, congestion. 



- Falls

Patient will be evaluated for Fall Precautions and will be placed on Fall Precautions as indicated pe
r protocol. 



- Skin Breakdown

Nursing will assess skin daily using assessment tool and will place on Skin Breakdown Precautions as 
indicated per protocol. 



- Pain

Clinical staff may employ non-medication methods such as massage, distraction, decrease stimulus, etc
. as needed. Clinical staff will assess patient's pain level every shift per protocol to assess and e
nsure pain management effectiveness. Medications will be given and the pain level re-assessed. 



PRELIMINARY PLAN OF CARE:



- Physical Therapy

Patient needs Physical Therapy for a daily minimum of 1.5 hours at least 5 out of 7 days, to improve:
 Mobility, Strengthening, Transfers, Stretching, ROM, Endurance, Ability to manage stairs, Gait, and 
Balance. 



- Speech Therapy

Patient needs Speech Therapy for a daily minimum of 0.5 hours at least 5 out of 7 days, to improve: S
wallowing, Cognition, Language Skills, and Compensatory Strategies. 



- Rehabilitation Nursing

Patient requires 24x7 Rehabilitation Nursing for: Pain Issues, Identifying and preventing risk factor
s, Monitoring and reporting current medical conditions, Assisting with ambulation and transfer, Lu
ting with all ADL-s, Teaching patients about disease process and medications, Family teaching, Provid
ing safe environment, Bowel and Bladder Issues, Skin Integrity, and Medication Management. 





Patient needs  and/or Case Management for: Discharge Planning, Arranging Home Equipmen
t or Services, and Family Interventions. 



- Dietary and Nutrition Services

Patient needs Dietary and Nutrition Services for: Adequate Nutrition, Nutritional Supplements, and Nu
tritional Education. 



- Occupational Therapy

Patient needs Occupational Therapy for a daily minimum of 1.5 hours at least 5 out of 7 days, to impr
ove Activities of Daily Living, including: Eating, Grooming, Bathing, Dressing, Toileting, Toilet Tra
nsfers, Community Reintegration, Higher functional activities, Adaptive Equipment, Splinting, Househo
ld Tasks, and Other activities as determined. 



QI SCORES:



- Self-Care

A. Eating  03-Partial/moderate assistance  

B. Oral hygiene  02-Substantial/maximal assistance  

C. Toileting hygiene  02-Substantial/maximal assistance  

E. Shower/bathe self  02-Substantial/maximal assistance  

F. Upper body dressing  02-Substantial/maximal assistance  

G. Lower body dressing  02-Substantial/maximal assistance  

H. Putting on/taking off footwear  88-Not attempted due to medical condition or safety concerns  

- Mobility

M. 1 step (curb)  88-Not attempted due to medical condition or safety concerns  

N. 4 steps  88-Not attempted due to medical condition or safety concerns  

O. 12 steps  88-Not attempted due to medical condition or safety concerns  

P. Picking up object  88-Not attempted due to medical condition or safety concerns  

R. Wheel 50 feet with two turns  88-Not attempted due to medical condition or safety concerns  

A. Roll left and right  03-Partial/moderate assistance  

B. Sit to lying  03-Partial/moderate assistance  

C. Lying to sitting on side of bed  03-Partial/moderate assistance  

D. Sit to stand  02-Substantial/maximal assistance  

E. Chair/bed-to-chair transfer  02-Substantial/maximal assistance  

F. Toilet transfer  88-Not attempted due to medical condition or safety concerns  

G. Car transfer  88-Not attempted due to medical condition or safety concerns  

I. Walk 10 feet  88-Not attempted due to medical condition or safety concerns  

J. Walk 50 feet with two turns  88-Not attempted due to medical condition or safety concerns  

K. Walk 150 feet  88-Not attempted due to medical condition or safety concerns  

L. Walking 10 feet on uneven surfaces  88-Not attempted due to medical condition or safety concerns  


S. Wheel 150 feet  88-Not attempted due to medical condition or safety concerns  

- Bladder and Bowel

Bladder continence      

Bowel continence      

- Endurance

Fair

- Balance

Fair

- Safety Awareness

Fair



POTENTIAL FUNCTIONAL GOALS FOR PATIENT TO ACHIEVE BY DISCHARGE:



- Safety Precaution

Patient will remain free from falls or injury at time of discharge. 



- Bed Mobility

Patient will perform bed mobility at 4-Ross level of assistance. 



- Transfers

Patient will complete transfers from bed to chair at 4-Ross level of assistance. 



- Mobility

Patient will ambulate 150 ft with 4-Ross level of assistance with RW. 



PATIENT REHAB POTENTIAL



MIGUELITO BRADSHAW is able and expected to receive 3 hours of individualized therapy daily on at least 5 of e
very 7 days

MIGUELITO BRADSHAW's prognosis for significant practical improvement within a reasonable period of time appea
rs Good

Expected level of measurable improvement will be of a practical value to MIGUELITO BRADSHAW's functional capa
city or adaptations to impairments

Has a viable Discharge Plan

Medically appropriate; condition is sufficiently stable to participate in intensive rehab program



DISCHARGE PLAN:



- Estimated Length of Stay (days)

17. 



- Consensus on plan

Discharge plan has been discussed with primary caregiver. Patient/Family is in agreement with the filiberto
n. Primary caregiver is in agreement with the plan. 



- Patient/Family Goals

Return home independently. 



- Planned Living Setting Upon Discharge

Home, to live alone. Transitional Living. Primary caregiver: Pt self. 



CONCLUSION ON REHABILITATION NECESSITY:



I have evaluated patient's pre-admission functional status and, comparing it to the patient's post-ad
mission functional status now, I conclude that the pre-admission assessment was accurate. Patient's c
ondition on admission supports the medical necessity of admission to IRF. It is safe to proceed with 
patient's therapy program.



SIGNATURE PANEL:



Electronically signed by Dr. Michael Salinas M.D. on 09/11/2020 at 13:38 (CDT)

## 2020-09-14 NOTE — R.PN
PROGRESS NOTES



ENCOUNTER DATE AND TIME:



09/14/2020 19:24 (CDT)



NAME



BRENDA BRADSHAW



YOB: 1945



DATE OF ADMISSION:



09/10/2020 18:41 (CDT)



Left hemispheric STROKECHIEF COMPLAINT:



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



SUBJECTIVE:



Pt denied any depression.

Pt denied any Shortness of Breath.

CBC with differential is essentially normal. Glucose 143 to 189. Prealbumin 27.3. UA with C and S  sh
owed E-Coli sensitive to Cipro. Will treat with Cipro 500 mg bid for 5 days.

Gait training done in the parallel bars with maximum assistance.



VITAL SIGNS



Temperature: 97.4 F

SBP/DBP: 159/76

Pulse: 61

Resp: 16



MEDICATION ALLERGIES:



No Known Drug Allergies (NKDA)



ENVIRONMENTAL ALLERGIES:



- Substance Allergies

None Known

- Other Allergies

None Known



NURSING:



- Shower

allowing shower

- Bladder

care per protocol

- Skin

care per protocol



PRECAUTIONS:



- Weight Bearing Precaution

WBAT right LE



ACTIVITIES



OOB only with supervision



THERAPIES:



- Occupational Therapy

Cognitive Retraining. Visual Perceptual Training. 



- Dietary and Nutrition

Adequate Nutrition. Nutritional Education. Nutritional Supplements. 



- Speech Therapy

Cognitive Training. Expressive Language Skills. Memory Strategies. Receptive Language Skills. Speech 
Intelligibility Training. 



PHYSICAL EXAM



- Gen

Alert and awake

Lying in bed

No apparent distress

Oriented to: person, time, and place

- Skin

No breakdown



No abnormalities

- Eyes

No abnormalities

- ENMT

No abnormalities

- Neck

No abnormalities

- CVS

RRR

- Chest

No abnormalities

- Abd

Soft

- GI

Non distended



Deferred

- 

No abnormalities

- Ext

Mild right lower extremity edema.

- MSK

0/5 weakness in right lower extremity, 4/5 right upper extremity strength.

- Neuro

0/5 weakness in right lower extremity, 4/5 right upper extremity strength.

- Psych

No abnormalities



ASSESSMENT:



Currently, he has deficits of Locomotion, Balance, Transfers Control, Sphincter Control, and Enduranc
e.On 08/31/2020 Pt. presented to [facility] with sudden onset of right-side weakness.On 08/31/202
0 he was admitted to [facility] with diagnosis Left hemispheric STROKE.His impairment category is
 Stroke 01 -  Right Body (Left Brain) (01.2).Pt. is now referred to [facility]
 for acute in-patient rehabilitation in order to maximize patient's functional independence in activi
ties of daily living, strength, ROM, and mobility.Pre-morbidly, Pt. was independent/mod-I in Locomoti
on, Safety Awareness, Social Cognition, Balance, Transfers Control, Self-Care, and Communication; and
 he had good Sphincter Control and Endurance.Pt. is a 75 yo Right-handed male of unknown race.- Rehab
 Goal

Patient has realistic goal of being discharged at assistance level 7-Ind to reside at Home with  Pt s
elf.

MDM/PLAN:



- Physical Therapy

Edema - to improve, our physical therapists will perform initial evaluation of pt's status upon admis
matty and devise an individualized program for Elevation Training, and Lymphedema Therapy

Gait dysfunction - to improve, our physical therapists will perform initial evaluation of pt's status
 upon admission and devise an individualized program for Gait Training, and Wheel Chair mobility

Inability to transfer - to improve, our physical therapists will perform initial evaluation of pt's s
tatus upon admission and devise an individualized program for Bed mobility

Need for home safety evaluation - to improve, our physical therapists will perform initial evaluation
 of pt's status upon admission and devise an individualized program for Home Evaluation

Need in caregiver upon discharge - to improve, our physical therapists will perform initial evaluatio
n of pt's status upon admission and devise an individualized program for Caregiver Training

New precaution - to improve, our physical therapists will perform initial evaluation of pt's status u
rafia admission and devise an individualized program for Patient precaution education

Poor balance - to improve, our physical therapists will perform initial evaluation of pt's status upo
n admission and devise an individualized program for Balance Training

Poor endurance - to improve, our physical therapists will perform initial evaluation of pt's status u
rafia admission and devise an individualized program for Endurance Training

Weakness - to improve, our physical therapists will perform initial evaluation of pt's status upon ad
mission and devise an individualized program for Aquatic Therapy, Neuromuscular Reeducation, and Stre
ngthening

- Occupational Therapy

Need for care giver - to improve, our occupation therapists will perform initial evaluation of pt's s
tatus upon admission and devise an individualized program for Caregiver Training

Weakness - to improve, our occupation therapists will perform initial evaluation of pt's status upon 
admission and devise an individualized program for Aquatic Therapy, Balance, Endurance, UE ROM, and U
E strengthening

- Cognition - orientation

for aphasia

- Dressing

Status: dep

 for ADL deficits

- Grooming

Status: min

 for ADL deficits

- Toilet Transfer

Status: dep

 for ADL deficits

- Bed to Chair Transfer squat pivot transfer

Status: dep

 for ADL deficits

- Tub Transfer

Status: dep

 for ADL deficits

- Shower Transfer

Status: dep

 for ADL deficits

- Wheel Chair to Bed Transfer

Status: dep

 for ADL deficits

- Other

 See attached MAR (Medication Administration Record)

- Diet Type

Continue Regular

- Diet - Liquid Texture

Continue Regular

- Tube Feed

Continue N/A

- Bladder

 care per protocol

- Weight Bearing Precaution

 WBAT right LE

- Skin

 care per protocol

- Diet - Solid Texture

Continue Regular

- Shower

 allowing shower



 for Dementia, TBI, Stroke, or others

- Balance

 for Weakness

- Bed mobility

 for ADL deficits

- Eating

 for ADL deficits

- Hygiene

 for ADL deficits



FUNCTIONAL STATUS: UPDATED AT WEEKLY TEAM CONFERENCE



- Bladder

Same accident frequency: 7-Ind - No accidents in the past 7 days

- Bowel

Same accident frequency: 7-Ind - No accidents in the past 7 days

- Walking

Same score based on distance walked: 0(N/A)

- Wheelchair

Same score based on distance traveled: 0(N/A)



FUNCTIONAL STATUS:



- Self-Care

A. Eating    Jose   

B. Grooming    sup   

C. Bathing    maxA   

D. Dressing - Upper     modA   

E. Dressing - Lower     maxA   

F. Toileting    maxA   

- Sphincter Control

G. Bladder control     Ross   

H. Bowel control     Ross   

- Transfers Control

I. Bed/Chair/Wheelchair     maxA   

J. Toilet    maxA   

K. Tub/Shower    maxA   

- Locomotion

L. Walk/Wheelchair (B)     maxA   

M. Stairs    ADNO   

- Communication

N. Comprehension (B)     sup   

O. Expression (B)     sup   

- Social Cognition

P. Social Interaction    Jose   

Q. Problem Solving    sup   

R. Memory    sup   

- Endurance

Poor

- Balance

Poor

- Safety Awareness

Poor



QI SCORES:



- Self-Care

A. Eating  03-Partial/moderate assistance  

B. Oral hygiene  02-Substantial/maximal assistance  

C. Toileting hygiene  02-Substantial/maximal assistance  

E. Shower/bathe self  02-Substantial/maximal assistance  

F. Upper body dressing  02-Substantial/maximal assistance  

G. Lower body dressing  02-Substantial/maximal assistance  

H. Putting on/taking off footwear  88-Not attempted due to medical condition or safety concerns  

- Mobility

M. 1 step (curb)  88-Not attempted due to medical condition or safety concerns  

N. 4 steps  88-Not attempted due to medical condition or safety concerns  

O. 12 steps  88-Not attempted due to medical condition or safety concerns  

P. Picking up object  88-Not attempted due to medical condition or safety concerns  

R. Wheel 50 feet with two turns  88-Not attempted due to medical condition or safety concerns  

A. Roll left and right  03-Partial/moderate assistance  

B. Sit to lying  03-Partial/moderate assistance  

C. Lying to sitting on side of bed  03-Partial/moderate assistance  

D. Sit to stand  02-Substantial/maximal assistance  

E. Chair/bed-to-chair transfer  02-Substantial/maximal assistance  

F. Toilet transfer  88-Not attempted due to medical condition or safety concerns  

G. Car transfer  88-Not attempted due to medical condition or safety concerns  

I. Walk 10 feet  88-Not attempted due to medical condition or safety concerns  

J. Walk 50 feet with two turns  88-Not attempted due to medical condition or safety concerns  

K. Walk 150 feet  88-Not attempted due to medical condition or safety concerns  

L. Walking 10 feet on uneven surfaces  88-Not attempted due to medical condition or safety concerns  


S. Wheel 150 feet  88-Not attempted due to medical condition or safety concerns  

- Bladder and Bowel

Bladder continence      

Bowel continence      

- Endurance

Fair

- Balance

Fair

- Safety Awareness

Fair



CURRENT [facility]. DEFICITS:



Endurance, Balance, Self-Care, Safety Awareness, and Mobility



SIGNATURE PANEL:



Electronically signed by Dr. Michael Salinas M.D. on 09/14/2020 at 19:30 (CDT)

## 2020-09-15 NOTE — R.PN
PROGRESS NOTES



ENCOUNTER DATE AND TIME:



09/15/2020 19:33 (CDT)



NAME



BRENDA BRADSHAW



YOB: 1945



DATE OF ADMISSION:



09/10/2020 18:41 (CDT)



Left hemispheric STROKECHIEF COMPLAINT:



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



SUBJECTIVE:



Pt denied any depression.

Pt denied any Shortness of Breath.

CBC with differential is essentially normal. Glucose 148 to 175. Prealbumin 27.3. UA with C and S  sh
owed E-Coli sensitive to Cipro. Will treat with Cipro 500 mg bid for 5 days.

Gait training done in the parallel bars with maximum assistance.

INR 1.11 will give coumadin 7 mg today and recheck INR in the AM.



VITAL SIGNS



Temperature: 97.5 F

SBP/DBP: 129/52

Pulse: 62

Resp: 16



MEDICATION ALLERGIES:



No Known Drug Allergies (NKDA)



ENVIRONMENTAL ALLERGIES:



- Substance Allergies

None Known

- Other Allergies

None Known



NURSING:



- Shower

allowing shower

- Bladder

care per protocol

- Skin

care per protocol



PRECAUTIONS:



- Weight Bearing Precaution

WBAT right LE



ACTIVITIES



OOB only with supervision



THERAPIES:



- Occupational Therapy

Cognitive Retraining. Visual Perceptual Training. 



- Dietary and Nutrition

Adequate Nutrition. Nutritional Education. Nutritional Supplements. 



- Speech Therapy

Cognitive Training. Expressive Language Skills. Memory Strategies. Receptive Language Skills. Speech 
Intelligibility Training. 



PHYSICAL EXAM



- Gen

Alert and awake

Lying in bed

No apparent distress

Oriented to: person, time, and place

- Skin

No breakdown



No abnormalities

- Eyes

No abnormalities

- ENMT

No abnormalities

- Neck

No abnormalities

- CVS

RRR

- Chest

No abnormalities

- Abd

Soft

- GI

Non distended



Deferred

- 

No abnormalities

- Ext

Mild right lower extremity edema.

- MSK

0/5 weakness in right lower extremity, 4/5 right upper extremity strength.

- Neuro

0/5 weakness in right lower extremity, 4/5 right upper extremity strength.

- Psych

No abnormalities



ASSESSMENT:



Currently, he has deficits of Locomotion, Balance, Transfers Control, Sphincter Control, and Enduranc
e.On 08/31/2020 Pt. presented to [facility] with sudden onset of right-side weakness.On 08/31/202
0 he was admitted to [facility] with diagnosis Left hemispheric STROKE.His impairment category is
 Stroke 01 -  Right Body (Left Brain) (01.2).Pt. is now referred to [facility]
 for acute in-patient rehabilitation in order to maximize patient's functional independence in activi
ties of daily living, strength, ROM, and mobility.Pre-morbidly, Pt. was independent/mod-I in Locomoti
on, Safety Awareness, Social Cognition, Balance, Transfers Control, Self-Care, and Communication; and
 he had good Sphincter Control and Endurance.Pt. is a 73 yo Right-handed male of unknown race.- Rehab
 Goal

Patient has realistic goal of being discharged at assistance level 7-Ind to reside at Home with  Pt s
elf.

MDM/PLAN:



- Physical Therapy

 Edema - to improve, our physical therapists will perform initial evaluation of pt's status upon admi
ssion and devise an individualized program for Elevation Training, and Lymphedema Therapy

 Gait dysfunction - to improve, our physical therapists will perform initial evaluation of pt's statu
s upon admission and devise an individualized program for Gait Training, and Wheel Chair mobility

 Inability to transfer - to improve, our physical therapists will perform initial evaluation of pt's 
status upon admission and devise an individualized program for Bed mobility

 Need for home safety evaluation - to improve, our physical therapists will perform initial evaluatio
n of pt's status upon admission and devise an individualized program for Home Evaluation

 Need in caregiver upon discharge - to improve, our physical therapists will perform initial evaluati
on of pt's status upon admission and devise an individualized program for Caregiver Training

 New precaution - to improve, our physical therapists will perform initial evaluation of pt's status 
upon admission and devise an individualized program for Patient precaution education

 Poor balance - to improve, our physical therapists will perform initial evaluation of pt's status up
on admission and devise an individualized program for Balance Training

 Poor endurance - to improve, our physical therapists will perform initial evaluation of pt's status 
upon admission and devise an individualized program for Endurance Training

 Weakness - to improve, our physical therapists will perform initial evaluation of pt's status upon a
dmission and devise an individualized program for Aquatic Therapy, Neuromuscular Reeducation, and Str
engthening

- Occupational Therapy

 Need for care giver - to improve, our occupation therapists will perform initial evaluation of pt's 
status upon admission and devise an individualized program for Caregiver Training

 Weakness - to improve, our occupation therapists will perform initial evaluation of pt's status upon
 admission and devise an individualized program for Aquatic Therapy, Balance, Endurance, UE ROM, and 
UE strengthening

- Cognition - orientation

 for aphasia

- Dressing

 Status: dep

 for ADL deficits

- Grooming

 Status: min

 for ADL deficits

- Toilet Transfer

 Status: dep

 for ADL deficits

- Bed to Chair Transfer squat pivot transfer

 Status: dep

 for ADL deficits

- Tub Transfer

 Status: dep

 for ADL deficits

- Shower Transfer

 Status: dep

 for ADL deficits

- Wheel Chair to Bed Transfer

 Status: dep

 for ADL deficits

- Other

 See attached MAR (Medication Administration Record)

- Diet Type

Continue Regular

- Diet - Liquid Texture

Continue Regular

- Tube Feed

Continue N/A

- Bladder

 care per protocol

- Weight Bearing Precaution

 WBAT right LE

- Skin

 care per protocol

- Diet - Solid Texture

Continue Regular

- Shower

 allowing shower



 for Dementia, TBI, Stroke, or others

- Balance

 for Weakness

- Bed mobility

 for ADL deficits

- Eating

 for ADL deficits

- Hygiene

 for ADL deficits



FUNCTIONAL STATUS: UPDATED AT WEEKLY TEAM CONFERENCE



- Bladder

Same accident frequency: 7-Ind - No accidents in the past 7 days

- Bowel

Same accident frequency: 7-Ind - No accidents in the past 7 days

- Walking

Same score based on distance walked: 0(N/A)

- Wheelchair

Same score based on distance traveled: 0(N/A)



FUNCTIONAL STATUS:



- Self-Care

A. Eating    Jose   

B. Grooming    sup   

C. Bathing    maxA   

D. Dressing - Upper     modA   

E. Dressing - Lower     maxA   

F. Toileting    maxA   

- Sphincter Control

G. Bladder control     Ross   

H. Bowel control     Ross   

- Transfers Control

I. Bed/Chair/Wheelchair     maxA   

J. Toilet    maxA   

K. Tub/Shower    maxA   

- Locomotion

L. Walk/Wheelchair (B)     maxA   

M. Stairs    ADNO   

- Communication

N. Comprehension (B)     sup   

O. Expression (B)     sup   

- Social Cognition

P. Social Interaction    Jose   

Q. Problem Solving    sup   

R. Memory    sup   

- Endurance

Poor

- Balance

Poor

- Safety Awareness

Poor



QI SCORES:



- Self-Care

A. Eating  03-Partial/moderate assistance  

B. Oral hygiene  02-Substantial/maximal assistance  

C. Toileting hygiene  02-Substantial/maximal assistance  

E. Shower/bathe self  02-Substantial/maximal assistance  

F. Upper body dressing  02-Substantial/maximal assistance  

G. Lower body dressing  02-Substantial/maximal assistance  

H. Putting on/taking off footwear  88-Not attempted due to medical condition or safety concerns  

- Mobility

M. 1 step (curb)  88-Not attempted due to medical condition or safety concerns  

N. 4 steps  88-Not attempted due to medical condition or safety concerns  

O. 12 steps  88-Not attempted due to medical condition or safety concerns  

P. Picking up object  88-Not attempted due to medical condition or safety concerns  

R. Wheel 50 feet with two turns  88-Not attempted due to medical condition or safety concerns  

A. Roll left and right  03-Partial/moderate assistance  

B. Sit to lying  03-Partial/moderate assistance  

C. Lying to sitting on side of bed  03-Partial/moderate assistance  

D. Sit to stand  02-Substantial/maximal assistance  

E. Chair/bed-to-chair transfer  02-Substantial/maximal assistance  

F. Toilet transfer  88-Not attempted due to medical condition or safety concerns  

G. Car transfer  88-Not attempted due to medical condition or safety concerns  

I. Walk 10 feet  88-Not attempted due to medical condition or safety concerns  

J. Walk 50 feet with two turns  88-Not attempted due to medical condition or safety concerns  

K. Walk 150 feet  88-Not attempted due to medical condition or safety concerns  

L. Walking 10 feet on uneven surfaces  88-Not attempted due to medical condition or safety concerns  


S. Wheel 150 feet  88-Not attempted due to medical condition or safety concerns  

- Bladder and Bowel

Bladder continence      

Bowel continence      

- Endurance

Fair

- Balance

Fair

- Safety Awareness

Fair



CURRENT [facility]. DEFICITS:



Endurance, Balance, Self-Care, Safety Awareness, and Mobility



SIGNATURE PANEL:



Electronically signed by Dr. Michael Salinas M.D. on 09/15/2020 at 19:36 (CDT)

## 2020-09-16 NOTE — R.PN
PROGRESS NOTES



ENCOUNTER DATE AND TIME:



09/16/2020 17:38 (CDT)



NAME



BRENDA BRADSHAW



YOB: 1945



DATE OF ADMISSION:



09/10/2020 18:41 (CDT)



Left hemispheric STROKECHIEF COMPLAINT:



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



SUBJECTIVE:



Pt denied any depression.

Pt denied any Shortness of Breath.

CBC with differential is essentially normal. Glucose 122 to 143. Prealbumin 27.3. UA with C and S  sh
owed E-Coli sensitive to Cipro. Treated with Cipro 500 mg bid for 5 days.

Gait training done in the parallel bars for 32' with moderate assistance.

INR 1.17 will give coumadin 10 mg today and recheck INR in the AM.



VITAL SIGNS



Temperature: 98.2 F

SBP/DBP: 117/45

Pulse: 67

Resp: 16



MEDICATION ALLERGIES:



No Known Drug Allergies (NKDA)



ENVIRONMENTAL ALLERGIES:



- Substance Allergies

None Known

- Other Allergies

None Known



NURSING:



- Shower

allowing shower

- Bladder

care per protocol

- Skin

care per protocol



PRECAUTIONS:



- Weight Bearing Precaution

WBAT right LE



ACTIVITIES



OOB only with supervision



THERAPIES:



- Occupational Therapy

Cognitive Retraining. Visual Perceptual Training. 



- Dietary and Nutrition

Adequate Nutrition. Nutritional Education. Nutritional Supplements. 



- Speech Therapy

Cognitive Training. Expressive Language Skills. Memory Strategies. Receptive Language Skills. Speech 
Intelligibility Training. 



PHYSICAL EXAM



- Gen

Alert and awake

Lying in bed

No apparent distress

Oriented to: person, time, and place

- Skin

No breakdown



No abnormalities

- Eyes

No abnormalities

- ENMT

No abnormalities

- Neck

No abnormalities

- CVS

RRR

- Chest

No abnormalities

- Abd

Soft

- GI

Non distended



Deferred

- 

No abnormalities

- Ext

Mild right lower extremity edema.

- MSK

0/5 weakness in right lower extremity, 4/5 right upper extremity strength.

- Neuro

0/5 weakness in right lower extremity, 4/5 right upper extremity strength.

- Psych

No abnormalities



ASSESSMENT:



Currently, he has deficits of Locomotion, Balance, Transfers Control, Sphincter Control, and Enduranc
e.On 08/31/2020 Pt. presented to [facility] with sudden onset of right-side weakness.On 08/31/202
0 he was admitted to [facility] with diagnosis Left hemispheric STROKE.His impairment category is
 Stroke 01 -  Right Body (Left Brain) (01.2).Pt. is now referred to [facility]
 for acute in-patient rehabilitation in order to maximize patient's functional independence in activi
ties of daily living, strength, ROM, and mobility.Pre-morbidly, Pt. was independent/mod-I in Locomoti
on, Safety Awareness, Social Cognition, Balance, Transfers Control, Self-Care, and Communication; and
 he had good Sphincter Control and Endurance.Pt. is a 75 yo Right-handed male of unknown race.- Rehab
 Goal

Patient has realistic goal of being discharged at assistance level 7-Ind to reside at Home with  Pt s
elf.

MDM/PLAN:



- Physical Therapy

 Edema - to improve, our physical therapists will perform initial evaluation of pt's status upon admi
ssion and devise an individualized program for Elevation Training, and Lymphedema Therapy

 Gait dysfunction - to improve, our physical therapists will perform initial evaluation of pt's statu
s upon admission and devise an individualized program for Gait Training, and Wheel Chair mobility

 Inability to transfer - to improve, our physical therapists will perform initial evaluation of pt's 
status upon admission and devise an individualized program for Bed mobility

 Need for home safety evaluation - to improve, our physical therapists will perform initial evaluatio
n of pt's status upon admission and devise an individualized program for Home Evaluation

 Need in caregiver upon discharge - to improve, our physical therapists will perform initial evaluati
on of pt's status upon admission and devise an individualized program for Caregiver Training

 New precaution - to improve, our physical therapists will perform initial evaluation of pt's status 
upon admission and devise an individualized program for Patient precaution education

 Poor balance - to improve, our physical therapists will perform initial evaluation of pt's status up
on admission and devise an individualized program for Balance Training

 Poor endurance - to improve, our physical therapists will perform initial evaluation of pt's status 
upon admission and devise an individualized program for Endurance Training

 Weakness - to improve, our physical therapists will perform initial evaluation of pt's status upon a
dmission and devise an individualized program for Aquatic Therapy, Neuromuscular Reeducation, and Str
engthening

- Occupational Therapy

 Need for care giver - to improve, our occupation therapists will perform initial evaluation of pt's 
status upon admission and devise an individualized program for Caregiver Training

 Weakness - to improve, our occupation therapists will perform initial evaluation of pt's status upon
 admission and devise an individualized program for Aquatic Therapy, Balance, Endurance, UE ROM, and 
UE strengthening

- Cognition - orientation

 for aphasia

- Dressing

 Status: dep

 for ADL deficits

- Grooming

 Status: min

 for ADL deficits

- Toilet Transfer

 Status: dep

 for ADL deficits

- Bed to Chair Transfer squat pivot transfer

 Status: dep

 for ADL deficits

- Tub Transfer

 Status: dep

 for ADL deficits

- Shower Transfer

 Status: dep

 for ADL deficits

- Wheel Chair to Bed Transfer

 Status: dep

 for ADL deficits

- Other

 See attached MAR (Medication Administration Record)

- Diet Type

Continue Regular

- Diet - Liquid Texture

Continue Regular

- Tube Feed

Continue N/A

- Bladder

 care per protocol

- Weight Bearing Precaution

 WBAT right LE

- Skin

 care per protocol

- Diet - Solid Texture

Continue Regular

- Shower

 allowing shower



 for Dementia, TBI, Stroke, or others

- Balance

 for Weakness

- Bed mobility

 for ADL deficits

- Eating

 for ADL deficits

- Hygiene

 for ADL deficits



FUNCTIONAL STATUS: UPDATED AT WEEKLY TEAM CONFERENCE



- Bladder

Same accident frequency: 7-Ind - No accidents in the past 7 days

- Bowel

Same accident frequency: 7-Ind - No accidents in the past 7 days

- Walking

Same score based on distance walked: 0(N/A)

- Wheelchair

Same score based on distance traveled: 0(N/A)



FUNCTIONAL STATUS:



- Self-Care

A. Eating    Jose   

B. Grooming    sup   

C. Bathing    maxA   

D. Dressing - Upper     modA   

E. Dressing - Lower     maxA   

F. Toileting    maxA   

- Sphincter Control

G. Bladder control     Ross   

H. Bowel control     Ross   

- Transfers Control

I. Bed/Chair/Wheelchair     maxA   

J. Toilet    maxA   

K. Tub/Shower    maxA   

- Locomotion

L. Walk/Wheelchair (B)     maxA   

M. Stairs    ADNO   

- Communication

N. Comprehension (B)     sup   

O. Expression (B)     sup   

- Social Cognition

P. Social Interaction    Jose   

Q. Problem Solving    sup   

R. Memory    sup   

- Endurance

Poor

- Balance

Poor

- Safety Awareness

Poor



QI SCORES:



- Self-Care

A. Eating  03-Partial/moderate assistance  

B. Oral hygiene  02-Substantial/maximal assistance  

C. Toileting hygiene  02-Substantial/maximal assistance  

E. Shower/bathe self  02-Substantial/maximal assistance  

F. Upper body dressing  02-Substantial/maximal assistance  

G. Lower body dressing  02-Substantial/maximal assistance  

H. Putting on/taking off footwear  88-Not attempted due to medical condition or safety concerns  

- Mobility

M. 1 step (curb)  88-Not attempted due to medical condition or safety concerns  

N. 4 steps  88-Not attempted due to medical condition or safety concerns  

O. 12 steps  88-Not attempted due to medical condition or safety concerns  

P. Picking up object  88-Not attempted due to medical condition or safety concerns  

R. Wheel 50 feet with two turns  88-Not attempted due to medical condition or safety concerns  

A. Roll left and right  03-Partial/moderate assistance  

B. Sit to lying  03-Partial/moderate assistance  

C. Lying to sitting on side of bed  03-Partial/moderate assistance  

D. Sit to stand  02-Substantial/maximal assistance  

E. Chair/bed-to-chair transfer  02-Substantial/maximal assistance  

F. Toilet transfer  88-Not attempted due to medical condition or safety concerns  

G. Car transfer  88-Not attempted due to medical condition or safety concerns  

I. Walk 10 feet  88-Not attempted due to medical condition or safety concerns  

J. Walk 50 feet with two turns  88-Not attempted due to medical condition or safety concerns  

K. Walk 150 feet  88-Not attempted due to medical condition or safety concerns  

L. Walking 10 feet on uneven surfaces  88-Not attempted due to medical condition or safety concerns  


S. Wheel 150 feet  88-Not attempted due to medical condition or safety concerns  

- Bladder and Bowel

Bladder continence      

Bowel continence      

- Endurance

Fair

- Balance

Fair

- Safety Awareness

Fair



CURRENT [facility]. DEFICITS:



Endurance, Balance, Self-Care, Safety Awareness, and Mobility



SIGNATURE PANEL:



Electronically signed by Dr. Michael Salinas M.D. on 09/16/2020 at 17:45 (CDT)

## 2020-09-18 NOTE — FAST
OT QI REPORT FORM



ENCOUNTER DATE AND TIME:



09/18/2020 08:00 (CDT)



NAME



BRENDA BRADSHAW



YOB: 1945



DATE OF ADMISSION:



09/10/2020 18:41 (CDT)



PHONE:



[phone]



AGE:



74



N#



[id number]



GENDER:



Male



ENCOUNTER PHYSICIAN:



Dr. Michael Salinas M.D.



ADMISSION DIAGNOSIS:



- Stroke 01 -  Right Body (Left Brain) (01.2)

Left hemispheric STROKE. 



EATING:



Not assessed/no information 



CODE:



-  



ORAL HYGIENE:



Not assessed/no information 



CODE:



-  



TOILETING HYGIENE:



Not assessed/no information 



CODE:



-  



BATHING:



SHOWER/BATHE SELF - STEP 1:



Does the patient complete the activity by him/herself with no assistance (physical, verbal/nonverbal 
cueing, setup/clean-up)? No.



SHOWER/BATHE SELF - STEP 2:



Does the patient need only setup/clean-up assistance from one helper? No.



SHOWER/BATHE SELF - STEP 3:



Does the patient need only verbal/nonverbal cueing or touching/steadying/contact guard assistance fro
m one helper? No.



SHOWER/BATHE SELF - STEP 4:



Does the patient need physical assistance - for example lifting or trunk support from one helper - wi
th the helper providing less than half of the effort? Yes.



1. [id number] ADMISSION PERFORMANCE:



Partial/moderate assistance   



CODE:



03  



DRESSING - UPPER BODY:



DRESSING - UPPER BODY - STEP 1:



Does the patient complete the activity by him/herself with no assistance (physical, verbal/nonverbal 
cueing, setup/clean-up)? No.



DRESSING - UPPER BODY - STEP 2:



Does the patient need only setup/clean-up assistance from one helper? No.



DRESSING - UPPER BODY - STEP 3:



Does the patient need only verbal/nonverbal cueing or touching/steadying/contact guard assistance fro
m one helper? No.



DRESSING - UPPER BODY - STEP 4:



Does the patient need physical assistance - for example lifting or trunk support from one helper - wi
th the helper providing less than half of the effort? Yes.



1. [id number] ADMISSION PERFORMANCE:



Partial/moderate assistance   



CODE:



03  



DRESSING - LOWER BODY:



DRESSING - LOWER BODY - STEP 1:



Does the patient complete the activity by him/herself with no assistance (physical, verbal/nonverbal 
cueing, setup/clean-up)? No.



DRESSING - LOWER BODY - STEP 2:



Does the patient need only setup/clean-up assistance from one helper? No.



DRESSING - LOWER BODY - STEP 3:



Does the patient need only verbal/nonverbal cueing or touching/steadying/contact guard assistance fro
m one helper? No.



DRESSING - LOWER BODY - STEP 4:



Does the patient need physical assistance - for example lifting or trunk support from one helper - wi
th the helper providing less than half of the effort? Yes.



1. [id number] ADMISSION PERFORMANCE:



Partial/moderate assistance   



CODE:



03  



PUTTING ON/TAKING OFF FOOTWEAR:



FOOTWEAR - STEP 1:



Does the patient complete the activity by him/herself with no assistance (physical, verbal/nonverbal 
cueing, setup/clean-up)? No.



FOOTWEAR - STEP 2:



Does the patient need only setup/clean-up assistance from one helper? No.



FOOTWEAR - STEP 3:



Does the patient need only verbal/nonverbal cueing or touching/steadying/contact guard assistance fro
m one helper? No.



FOOTWEAR - STEP 4:



Does the patient need physical assistance - for example lifting or trunk support from one helper - wi
th the helper providing less than half of the effort? No.



FOOTWEAR - STEP 5:



Does the patient need physical assistance - for example lifting or trunk support from one helper - wi
th the helper providing more than half of the effort? Yes.



1. [id number] ADMISSION PERFORMANCE:



Substantial/maximal assistance   



CODE:



02  



DOES THE PATIENT USE A WHEELCHAIR/SCOOTER?



CODE:



EXPR  



INDICATE THE TYPE OF WHEELCHAIR/SCOOTER USED:



CODE:



EXPR  



INDICATE THE TYPE OF WHEELCHAIR/SCOOTER USED:



CODE:



EXPR  



BLADDER AND BOWEL:



CODE:



EXPR  



CODE:



EXPR  



SIGNATURE PANEL:



The following modified sections: 1. [id number] Admission Performance, 1. [id number] Admission Performance, 
1. [id number] Admission Performance, 1. [id number] Admission Performance were [electronically] signed by Christal Fine OT on Fri Sep 18 2020 10:36:58 GMT-0500 (Central Daylight Time)

## 2020-09-18 NOTE — P.RH.PN
Estimated Length of Stay: 24


Expected Discharge Date: 10/03/20


Discharge Disposition Plan: Home


Family Support: Yes


Long Term Goal: Mobility, Transfers, Self Care


Vital Signs: 


                                Last Vital Signs











Temp  98.1 F   09/18/20 07:50


 


Pulse  64   09/18/20 07:50


 


Resp  16   09/18/20 07:50


 


BP  130/54 L  09/18/20 07:50


 


Pulse Ox  97   09/18/20 07:50











Laboratory: 


                             Laboratory Last Values











WBC  6.4 K/uL (4.3-10.9)  D 09/17/20  05:51    


 


RBC  4.18 M/uL (4.33-5.43)  L  09/17/20  05:51    


 


Hgb  12.2 g/dL (13.6-17.9)  L  09/17/20  05:51    


 


Hct  35.1 % (39.6-49.0)  L  09/17/20  05:51    


 


MCV  83.9 fL ()   09/17/20  05:51    


 


MCH  29.1 pg (27.0-35.0)   09/17/20  05:51    


 


MCHC  34.7 g/dL (32.0-36.0)   09/17/20  05:51    


 


RDW  14.8 % (12.1-15.2)   09/17/20  05:51    


 


Plt Count  113 K/uL (152-406)  L D 09/17/20  05:51    


 


MPV  9.6 fL (7.6-11.3)   09/17/20  05:51    


 


Neutrophils %  70.6 % (41.7-73.7)   09/17/20  05:51    


 


Lymphocytes %  18.1 % (15.3-44.8)   09/17/20  05:51    


 


Monocytes %  9.1 % (3.3-12.3)   09/17/20  05:51    


 


Eosinophils %  1.6 % (0-4.4)   09/17/20  05:51    


 


Basophils %  0.6 % (0-1.3)   09/17/20  05:51    


 


Absolute Neutrophils  4.5 K/uL (1.8-8.0)   09/17/20  05:51    


 


Absolute Lymphocytes  1.2 K/uL (0.7-4.9)   09/17/20  05:51    


 


Absolute Monocytes  0.6 K/uL (0.1-1.3)   09/17/20  05:51    


 


Absolute Eosinophils  0.1 K/uL (0-0.5)   09/17/20  05:51    


 


Absolute Basophils  0.0 K/uL (0-0.5)   09/17/20  05:51    


 


Platelet Estimate  Decr   09/11/20  06:00    


 


Giant Platelets  Present   09/11/20  06:00    


 


Morphology Comment  Not seen  (NOT SEEN)   09/11/20  06:00    


 


PT  17.5 SECONDS (9.5-12.5)  H  09/18/20  06:13    


 


INR  1.49   09/18/20  06:13    


 


Sodium  139 mmol/L (136-145)   09/17/20  05:51    


 


Potassium  4.1 mmol/L (3.5-5.1)   09/17/20  05:51    


 


Chloride  107 mmol/L ()   09/17/20  05:51    


 


Carbon Dioxide  25 mmol/L (21-32)   09/17/20  05:51    


 


BUN  19 mg/dL (7-18)  H  09/17/20  05:51    


 


Creatinine  0.92 mg/dL (0.55-1.3)   09/17/20  05:51    


 


Estimated GFR  80 mL/min (=/>90)  L  09/17/20  05:51    


 


Glucose  118 mg/dL ()  H  09/17/20  05:51    


 


POC Glucose  150 mg/dL ()  H  09/18/20  07:06    


 


Calcium  8.2 mg/dL (8.5-10.1)  L  09/17/20  05:51    


 


Magnesium  2.3 mg/dL (1.8-2.4)   09/17/20  05:51    


 


Albumin  2.7 g/dL (3.4-5.0)  L  09/17/20  05:51    


 


Prealbumin  23.6 mg/dL (20-40)   09/17/20  05:51    


 


Urine Color  Yellow   09/10/20  22:25    


 


Urine Appearance  Clear   09/10/20  22:25    


 


Urine pH  6.0  (5.0-7.0)   09/10/20  22:25    


 


Ur Specific Gravity  >=1.030  (1.005-1.030)   09/10/20  22:25    


 


Glucose (UA)(Auto)  1+  (NEG)  H  09/10/20  22:25    


 


Urine Ketones  Negative  (NEG)   09/10/20  22:25    


 


Urine Blood  Negative  (NEG)   09/10/20  22:25    


 


Urine Nitrite  Negative  (NEG)   09/10/20  22:25    


 


Urine Bilirubin  Negative  (NEG)   09/10/20  22:25    


 


Urine Urobilinogen  1.0 mg/dL (0.2-1.0)   09/10/20  22:25    


 


Ur Leukocyte Esterase  Negative  (NEG)   09/10/20  22:25    


 


Urine RBC  <5 /HPF (NONE SEEN)   09/10/20  22:25    


 


Urine WBC  <5 /HPF (<5)   09/10/20  22:25    


 


Ur Squamous Epith Cells  5-10 /HPF (NONE SEEN)  H  09/10/20  22:25    


 


Calcium Oxalate Crystal  Many  (NONE SEEN)  H  09/10/20  22:25    


 


Urine Bacteria  <20 /HPF (NONE SEEN)   09/10/20  22:25    


 


Urine Culture Reflexed  Not needed   09/10/20  22:25    


 


Urine Total Protein  Negative  (NEG)   09/10/20  22:25    


 


SARS-CoV-2 RNA (RT-PCR)  Negative  (NEGATIVE)   09/11/20  22:00    


 


Smear Scan  Ok  (OK)   09/11/20  06:00    











Weight: 220 lb


Wound Present: No


Closed Surgical Incision Present: No


Negative Pressure Wound Therapy Present: No


Physician Update: His INR is 1.49. Will give coumadin 10 today and recheck in 

the AM. Labs otherwise stable. He is max assistance for ADLs. For balance and 

upper body dressiing moderate assitance and moderate assitance for showers. He 

is standby in parallel and walked 8'. Transfers are moderate to total 

assistance.


Comment: Redness on the bilateral groins and buttocks,on nystatin powder.


Functional Improvement: pt presents with severe strength deficits in the R LE.  

pt demonstrates poor trunk strength and stability.  pt exhibits poor balance in 

both sitting and standing.  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  pt 

experiences poor tolerance to functional activity due to weakness, fatigue, and 

reduced balance.  Skilled PT services are necessary to address the above 

mentioned impairments and functional limitations.  pt will likely require a long

course of stay; however, pt retains good potential for improvement.  pt is 

motivated and exhibits exceptional strength on the L side.


Summary: Patient's care plan and long term goals have been reviewed and revised 

as necessary. Please see the Rehabilitation Signature page for all necessary 

signatures.

## 2020-09-21 NOTE — R.PN
PROGRESS NOTES



ENCOUNTER DATE AND TIME:



09/21/2020 17:17 (CDT)



NAME



BRENDA BRADSHAW



YOB: 1945



DATE OF ADMISSION:



09/10/2020 18:41 (CDT)



Left hemispheric STROKECHIEF COMPLAINT:



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



SUBJECTIVE:



Pt denied any depression.

Pt denied any Shortness of Breath.

CBC with differential is unremarkable. Glucose 90 to 127. Prealbumin 27.3. UA with C and S  showed E-
Coli sensitive to Cipro. Treated with Cipro 500 mg bid for 5 days.

Gait training done using a rolling walker for 3' with moderate to maximum assistance.

INR 1.97, will give coumadin 5 mg daily and recheck INR in the AM.



VITAL SIGNS



Temperature: 97.4 F

SBP/DBP: 123/46

Pulse: 63

Resp: 16



MEDICATION ALLERGIES:



No Known Drug Allergies (NKDA)



ENVIRONMENTAL ALLERGIES:



- Substance Allergies

None Known

- Other Allergies

None Known



NURSING:



- Shower

allowing shower

- Bladder

care per protocol

- Skin

care per protocol



PRECAUTIONS:



- Weight Bearing Precaution

WBAT right LE



ACTIVITIES



OOB only with supervision



THERAPIES:



- Occupational Therapy

Cognitive Retraining. Visual Perceptual Training. 



- Dietary and Nutrition

Adequate Nutrition. Nutritional Education. Nutritional Supplements. 



- Speech Therapy

Cognitive Training. Expressive Language Skills. Memory Strategies. Receptive Language Skills. Speech 
Intelligibility Training. 



PHYSICAL EXAM



- Gen

Alert and awake

Lying in bed

No apparent distress

Oriented to: person, time, and place

- Skin

No breakdown



No abnormalities

- Eyes

No abnormalities

- ENMT

No abnormalities

- Neck

No abnormalities

- CVS

RRR

- Chest

No abnormalities

- Abd

Soft

- GI

Non distended



Deferred

- 

No abnormalities

- Ext

Mild right lower extremity edema.

- MSK

0/5 weakness in right lower extremity, 4/5 right upper extremity strength.

- Neuro

0/5 weakness in right lower extremity, 4/5 right upper extremity strength.

- Psych

No abnormalities



ASSESSMENT:



Currently, he has deficits of Locomotion, Balance, Transfers Control, Sphincter Control, and Enduranc
e.On 08/31/2020 Pt. presented to [facility] with sudden onset of right-side weakness.On 08/31/202
0 he was admitted to [facility] with diagnosis Left hemispheric STROKE.His impairment category is
 Stroke 01 -  Right Body (Left Brain) (01.2).Pt. is now referred to [facility]
 for acute in-patient rehabilitation in order to maximize patient's functional independence in activi
ties of daily living, strength, ROM, and mobility.Pre-morbidly, Pt. was independent/mod-I in Locomoti
on, Safety Awareness, Social Cognition, Balance, Transfers Control, Self-Care, and Communication; and
 he had good Sphincter Control and Endurance.Pt. is a 73 yo Right-handed male of unknown race.- Rehab
 Goal

Patient has realistic goal of being discharged at assistance level 7-Ind to reside at Home with  Pt s
elf.

MDM/PLAN:



- Physical Therapy

 Edema - to improve, our physical therapists will perform initial evaluation of pt's status upon admi
ssion and devise an individualized program for Elevation Training, and Lymphedema Therapy

 Gait dysfunction - to improve, our physical therapists will perform initial evaluation of pt's statu
s upon admission and devise an individualized program for Gait Training, and Wheel Chair mobility

 Inability to transfer - to improve, our physical therapists will perform initial evaluation of pt's 
status upon admission and devise an individualized program for Bed mobility

 Need for home safety evaluation - to improve, our physical therapists will perform initial evaluatio
n of pt's status upon admission and devise an individualized program for Home Evaluation

 Need in caregiver upon discharge - to improve, our physical therapists will perform initial evaluati
on of pt's status upon admission and devise an individualized program for Caregiver Training

 New precaution - to improve, our physical therapists will perform initial evaluation of pt's status 
upon admission and devise an individualized program for Patient precaution education

 Poor balance - to improve, our physical therapists will perform initial evaluation of pt's status up
on admission and devise an individualized program for Balance Training

 Poor endurance - to improve, our physical therapists will perform initial evaluation of pt's status 
upon admission and devise an individualized program for Endurance Training

 Weakness - to improve, our physical therapists will perform initial evaluation of pt's status upon a
dmission and devise an individualized program for Aquatic Therapy, Neuromuscular Reeducation, and Str
engthening

- Occupational Therapy

 Need for care giver - to improve, our occupation therapists will perform initial evaluation of pt's 
status upon admission and devise an individualized program for Caregiver Training

 Weakness - to improve, our occupation therapists will perform initial evaluation of pt's status upon
 admission and devise an individualized program for Aquatic Therapy, Balance, Endurance, UE ROM, and 
UE strengthening

- Cognition - orientation

 for aphasia

- Dressing

 Status: dep

 for ADL deficits

- Grooming

 Status: min

 for ADL deficits

- Toilet Transfer

 Status: dep

 for ADL deficits

- Bed to Chair Transfer squat pivot transfer

 Status: dep

 for ADL deficits

- Tub Transfer

 Status: dep

 for ADL deficits

- Shower Transfer

 Status: dep

 for ADL deficits

- Wheel Chair to Bed Transfer

 Status: dep

 for ADL deficits

- Other

 See attached MAR (Medication Administration Record)

- Diet Type

Continue Regular

- Diet - Liquid Texture

Continue Regular

- Tube Feed

Continue N/A

- Bladder

 care per protocol

- Weight Bearing Precaution

 WBAT right LE

- Skin

 care per protocol

- Diet - Solid Texture

Continue Regular

- Shower

 allowing shower



 for Dementia, TBI, Stroke, or others

- Balance

 for Weakness

- Bed mobility

 for ADL deficits

- Eating

 for ADL deficits

- Hygiene

 for ADL deficits



FUNCTIONAL STATUS: UPDATED AT WEEKLY TEAM CONFERENCE



- Bladder

Same accident frequency: 7-Ind - No accidents in the past 7 days

- Bowel

Same accident frequency: 7-Ind - No accidents in the past 7 days

- Walking

Same score based on distance walked: 0(N/A)

- Wheelchair

Same score based on distance traveled: 0(N/A)



FUNCTIONAL STATUS:



- Self-Care

A. Eating    Jose   

B. Grooming    sup   

C. Bathing    maxA   

D. Dressing - Upper     modA   

E. Dressing - Lower     maxA   

F. Toileting    maxA   

- Sphincter Control

G. Bladder control     Ross   

H. Bowel control     Ross   

- Transfers Control

I. Bed/Chair/Wheelchair     maxA   

J. Toilet    maxA   

K. Tub/Shower    maxA   

- Locomotion

L. Walk/Wheelchair (B)     maxA   

M. Stairs    ADNO   

- Communication

N. Comprehension (B)     sup   

O. Expression (B)     sup   

- Social Cognition

P. Social Interaction    Jose   

Q. Problem Solving    sup   

R. Memory    sup   

- Endurance

Poor

- Balance

Poor

- Safety Awareness

Poor



QI SCORES:



- Self-Care

A. Eating  03-Partial/moderate assistance  

B. Oral hygiene  02-Substantial/maximal assistance  

C. Toileting hygiene  02-Substantial/maximal assistance  

E. Shower/bathe self  02-Substantial/maximal assistance  

F. Upper body dressing  02-Substantial/maximal assistance  

G. Lower body dressing  02-Substantial/maximal assistance  

H. Putting on/taking off footwear  88-Not attempted due to medical condition or safety concerns  

- Mobility

M. 1 step (curb)  88-Not attempted due to medical condition or safety concerns  

N. 4 steps  88-Not attempted due to medical condition or safety concerns  

O. 12 steps  88-Not attempted due to medical condition or safety concerns  

P. Picking up object  88-Not attempted due to medical condition or safety concerns  

R. Wheel 50 feet with two turns  88-Not attempted due to medical condition or safety concerns  

A. Roll left and right  03-Partial/moderate assistance  

B. Sit to lying  03-Partial/moderate assistance  

C. Lying to sitting on side of bed  03-Partial/moderate assistance  

D. Sit to stand  02-Substantial/maximal assistance  

E. Chair/bed-to-chair transfer  02-Substantial/maximal assistance  

F. Toilet transfer  88-Not attempted due to medical condition or safety concerns  

G. Car transfer  88-Not attempted due to medical condition or safety concerns  

I. Walk 10 feet  88-Not attempted due to medical condition or safety concerns  

J. Walk 50 feet with two turns  88-Not attempted due to medical condition or safety concerns  

K. Walk 150 feet  88-Not attempted due to medical condition or safety concerns  

L. Walking 10 feet on uneven surfaces  88-Not attempted due to medical condition or safety concerns  


S. Wheel 150 feet  88-Not attempted due to medical condition or safety concerns  

- Bladder and Bowel

Bladder continence      

Bowel continence      

- Endurance

Fair

- Balance

Fair

- Safety Awareness

Fair



CURRENT [facility]. DEFICITS:



Endurance, Balance, Self-Care, Safety Awareness, and Mobility



SIGNATURE PANEL:



Electronically signed by Dr. Michael Salinas M.D. on 09/21/2020 at 17:23 (CDT)

## 2020-09-22 NOTE — R.PN
PROGRESS NOTES



ENCOUNTER DATE AND TIME:



09/22/2020 18:30 (CDT)



NAME



BRENDA BRADSHAW



YOB: 1945



DATE OF ADMISSION:



09/10/2020 18:41 (CDT)



Left hemispheric STROKECHIEF COMPLAINT:



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



SUBJECTIVE:



Pt denied any depression.

Pt denied any Shortness of Breath.

CBC with differential is unremarkable. Glucose 90 to 127. Prealbumin 27.3. UA with C and S  showed E-
Coli sensitive to Cipro. Treated with Cipro 500 mg bid for 5 days.

Patient states that pain is under control.

ADLs done with supervision to independence.

INR 1.98, will give coumadin 6 mg daily and recheck INR in the AM.



VITAL SIGNS



Temperature: 98.4 F

SBP/DBP: 133/51

Pulse: 61

Resp: 16



MEDICATION ALLERGIES:



No Known Drug Allergies (NKDA)



ENVIRONMENTAL ALLERGIES:



- Substance Allergies

None Known

- Other Allergies

None Known



NURSING:



- Shower

allowing shower

- Bladder

care per protocol

- Skin

care per protocol



PRECAUTIONS:



- Weight Bearing Precaution

WBAT right LE



ACTIVITIES



OOB only with supervision



THERAPIES:



- Occupational Therapy

Cognitive Retraining. Visual Perceptual Training. 



- Dietary and Nutrition

Adequate Nutrition. Nutritional Education. Nutritional Supplements. 



- Speech Therapy

Cognitive Training. Expressive Language Skills. Memory Strategies. Receptive Language Skills. Speech 
Intelligibility Training. 



PHYSICAL EXAM



- Gen

Alert and awake

Lying in bed

No apparent distress

Oriented to: person, time, and place

- Skin

No breakdown



No abnormalities

- Eyes

No abnormalities

- ENMT

No abnormalities

- Neck

No abnormalities

- CVS

RRR

- Chest

No abnormalities

- Abd

Soft

- GI

Non distended



Deferred

- 

No abnormalities

- Ext

Mild right lower extremity edema.

- MSK

0/5 weakness in right lower extremity, 4/5 right upper extremity strength.

- Neuro

0/5 weakness in right lower extremity, 4/5 right upper extremity strength.

- Psych

No abnormalities



ASSESSMENT:



Currently, he has deficits of Locomotion, Balance, Transfers Control, Sphincter Control, and Enduranc
e.On 08/31/2020 Pt. presented to [facility] with sudden onset of right-side weakness.On 08/31/202
0 he was admitted to [facility] with diagnosis Left hemispheric STROKE.His impairment category is
 Stroke 01 -  Right Body (Left Brain) (01.2).Pt. is now referred to [facility]
 for acute in-patient rehabilitation in order to maximize patient's functional independence in activi
ties of daily living, strength, ROM, and mobility.Pre-morbidly, Pt. was independent/mod-I in Locomoti
on, Safety Awareness, Social Cognition, Balance, Transfers Control, Self-Care, and Communication; and
 he had good Sphincter Control and Endurance.Pt. is a 73 yo Right-handed male of unknown race.- Rehab
 Goal

Patient has realistic goal of being discharged at assistance level 7-Ind to reside at Home with  Pt s
elf.

MDM/PLAN:



- Physical Therapy

 Edema - to improve, our physical therapists will perform initial evaluation of pt's status upon admi
ssion and devise an individualized program for Elevation Training, and Lymphedema Therapy

 Gait dysfunction - to improve, our physical therapists will perform initial evaluation of pt's statu
s upon admission and devise an individualized program for Gait Training, and Wheel Chair mobility

 Inability to transfer - to improve, our physical therapists will perform initial evaluation of pt's 
status upon admission and devise an individualized program for Bed mobility

 Need for home safety evaluation - to improve, our physical therapists will perform initial evaluatio
n of pt's status upon admission and devise an individualized program for Home Evaluation

 Need in caregiver upon discharge - to improve, our physical therapists will perform initial evaluati
on of pt's status upon admission and devise an individualized program for Caregiver Training

 New precaution - to improve, our physical therapists will perform initial evaluation of pt's status 
upon admission and devise an individualized program for Patient precaution education

 Poor balance - to improve, our physical therapists will perform initial evaluation of pt's status up
on admission and devise an individualized program for Balance Training

 Poor endurance - to improve, our physical therapists will perform initial evaluation of pt's status 
upon admission and devise an individualized program for Endurance Training

 Weakness - to improve, our physical therapists will perform initial evaluation of pt's status upon a
dmission and devise an individualized program for Aquatic Therapy, Neuromuscular Reeducation, and Str
engthening

- Occupational Therapy

 Need for care giver - to improve, our occupation therapists will perform initial evaluation of pt's 
status upon admission and devise an individualized program for Caregiver Training

 Weakness - to improve, our occupation therapists will perform initial evaluation of pt's status upon
 admission and devise an individualized program for Aquatic Therapy, Balance, Endurance, UE ROM, and 
UE strengthening

- Cognition - orientation

 for aphasia

- Dressing

 Status: dep

 for ADL deficits

- Grooming

 Status: min

 for ADL deficits

- Toilet Transfer

 Status: dep

 for ADL deficits

- Bed to Chair Transfer squat pivot transfer

 Status: dep

 for ADL deficits

- Tub Transfer

 Status: dep

 for ADL deficits

- Shower Transfer

 Status: dep

 for ADL deficits

- Wheel Chair to Bed Transfer

 Status: dep

 for ADL deficits

- Other

 See attached MAR (Medication Administration Record)

- Diet Type

Continue Regular

- Diet - Liquid Texture

Continue Regular

- Tube Feed

Continue N/A

- Bladder

 care per protocol

- Weight Bearing Precaution

 WBAT right LE

- Skin

 care per protocol

- Diet - Solid Texture

Continue Regular

- Shower

 allowing shower



 for Dementia, TBI, Stroke, or others

- Balance

 for Weakness

- Bed mobility

 for ADL deficits

- Eating

 for ADL deficits

- Hygiene

 for ADL deficits



FUNCTIONAL STATUS: UPDATED AT WEEKLY TEAM CONFERENCE



- Bladder

Same accident frequency: 7-Ind - No accidents in the past 7 days

- Bowel

Same accident frequency: 7-Ind - No accidents in the past 7 days

- Walking

Same score based on distance walked: 0(N/A)

- Wheelchair

Same score based on distance traveled: 0(N/A)



FUNCTIONAL STATUS:



- Self-Care

A. Eating    Jose   

B. Grooming    sup   

C. Bathing    maxA   

D. Dressing - Upper     modA   

E. Dressing - Lower     maxA   

F. Toileting    maxA   

- Sphincter Control

G. Bladder control     Ross   

H. Bowel control     Ross   

- Transfers Control

I. Bed/Chair/Wheelchair     maxA   

J. Toilet    maxA   

K. Tub/Shower    maxA   

- Locomotion

L. Walk/Wheelchair (B)     maxA   

M. Stairs    ADNO   

- Communication

N. Comprehension (B)     sup   

O. Expression (B)     sup   

- Social Cognition

P. Social Interaction    Jose   

Q. Problem Solving    sup   

R. Memory    sup   

- Endurance

Poor

- Balance

Poor

- Safety Awareness

Poor



QI SCORES:



- Self-Care

A. Eating  03-Partial/moderate assistance  

B. Oral hygiene  02-Substantial/maximal assistance  

C. Toileting hygiene  02-Substantial/maximal assistance  

E. Shower/bathe self  02-Substantial/maximal assistance  

F. Upper body dressing  02-Substantial/maximal assistance  

G. Lower body dressing  02-Substantial/maximal assistance  

H. Putting on/taking off footwear  88-Not attempted due to medical condition or safety concerns  

- Mobility

M. 1 step (curb)  88-Not attempted due to medical condition or safety concerns  

N. 4 steps  88-Not attempted due to medical condition or safety concerns  

O. 12 steps  88-Not attempted due to medical condition or safety concerns  

P. Picking up object  88-Not attempted due to medical condition or safety concerns  

R. Wheel 50 feet with two turns  88-Not attempted due to medical condition or safety concerns  

A. Roll left and right  03-Partial/moderate assistance  

B. Sit to lying  03-Partial/moderate assistance  

C. Lying to sitting on side of bed  03-Partial/moderate assistance  

D. Sit to stand  02-Substantial/maximal assistance  

E. Chair/bed-to-chair transfer  02-Substantial/maximal assistance  

F. Toilet transfer  88-Not attempted due to medical condition or safety concerns  

G. Car transfer  88-Not attempted due to medical condition or safety concerns  

I. Walk 10 feet  88-Not attempted due to medical condition or safety concerns  

J. Walk 50 feet with two turns  88-Not attempted due to medical condition or safety concerns  

K. Walk 150 feet  88-Not attempted due to medical condition or safety concerns  

L. Walking 10 feet on uneven surfaces  88-Not attempted due to medical condition or safety concerns  


S. Wheel 150 feet  88-Not attempted due to medical condition or safety concerns  

- Bladder and Bowel

Bladder continence      

Bowel continence      

- Endurance

Fair

- Balance

Fair

- Safety Awareness

Fair



CURRENT [facility]. DEFICITS:



Endurance, Balance, Self-Care, Safety Awareness, and Mobility



SIGNATURE PANEL:



Electronically signed by Dr. Michael Salinas M.D. on 09/22/2020 at 18:33 (CDT)

## 2020-09-23 NOTE — R.PN
PROGRESS NOTES



ENCOUNTER DATE AND TIME:



09/23/2020 17:55 (CDT)



NAME



BRENDA BRADSHAW



YOB: 1945



DATE OF ADMISSION:



09/10/2020 18:41 (CDT)



Left hemispheric STROKECHIEF COMPLAINT:



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



SUBJECTIVE:



Pt denied any depression.

Pt denied any Shortness of Breath.

CBC with differential is unremarkable. Glucose 90 to 127. Prealbumin 27.3. UA with C and S  showed E-
Coli sensitive to Cipro. Treated with Cipro 500 mg bid for 5 days.

Patient states that pain is under control.

ADLs done with supervision to independence.

Ambulated 250' with standby assistance using a rolling walker.

INR 1.70, will give coumadin 10 mg today then 6 daily and recheck INR in the AM.



VITAL SIGNS



Temperature: 97.4 F

SBP/DBP: 124/51

Pulse: 61

Resp: 16



MEDICATION ALLERGIES:



No Known Drug Allergies (NKDA)



ENVIRONMENTAL ALLERGIES:



- Substance Allergies

None Known

- Other Allergies

None Known



NURSING:



- Shower

allowing shower

- Bladder

care per protocol

- Skin

care per protocol



PRECAUTIONS:



- Weight Bearing Precaution

WBAT right LE



ACTIVITIES



OOB only with supervision



THERAPIES:



- Occupational Therapy

Cognitive Retraining. Visual Perceptual Training. 



- Dietary and Nutrition

Adequate Nutrition. Nutritional Education. Nutritional Supplements. 



- Speech Therapy

Cognitive Training. Expressive Language Skills. Memory Strategies. Receptive Language Skills. Speech 
Intelligibility Training. 



PHYSICAL EXAM



- Gen

Alert and awake

Lying in bed

No apparent distress

Oriented to: person, time, and place

- Skin

No breakdown



No abnormalities

- Eyes

No abnormalities

- ENMT

No abnormalities

- Neck

No abnormalities

- CVS

RRR

- Chest

No abnormalities

- Abd

Soft

- GI

Non distended



Deferred

- 

No abnormalities

- Ext

Mild right lower extremity edema.

- MSK

0/5 weakness in right lower extremity, 4/5 right upper extremity strength.

- Neuro

0/5 weakness in right lower extremity, 4/5 right upper extremity strength.

- Psych

No abnormalities



ASSESSMENT:



Currently, he has deficits of Locomotion, Balance, Transfers Control, Sphincter Control, and Enduranc
e.On 08/31/2020 Pt. presented to [facility] with sudden onset of right-side weakness.On 08/31/202
0 he was admitted to [facility] with diagnosis Left hemispheric STROKE.His impairment category is
 Stroke 01 -  Right Body (Left Brain) (01.2).Pt. is now referred to [facility]
 for acute in-patient rehabilitation in order to maximize patient's functional independence in activi
ties of daily living, strength, ROM, and mobility.Pre-morbidly, Pt. was independent/mod-I in Locomoti
on, Safety Awareness, Social Cognition, Balance, Transfers Control, Self-Care, and Communication; and
 he had good Sphincter Control and Endurance.Pt. is a 73 yo Right-handed male of unknown race.- Rehab
 Goal

Patient has realistic goal of being discharged at assistance level 7-Ind to reside at Home with  Pt s
elf.

MDM/PLAN:



- Physical Therapy

 Edema - to improve, our physical therapists will perform initial evaluation of pt's status upon admi
ssion and devise an individualized program for Elevation Training, and Lymphedema Therapy

 Gait dysfunction - to improve, our physical therapists will perform initial evaluation of pt's statu
s upon admission and devise an individualized program for Gait Training, and Wheel Chair mobility

 Inability to transfer - to improve, our physical therapists will perform initial evaluation of pt's 
status upon admission and devise an individualized program for Bed mobility

 Need for home safety evaluation - to improve, our physical therapists will perform initial evaluatio
n of pt's status upon admission and devise an individualized program for Home Evaluation

 Need in caregiver upon discharge - to improve, our physical therapists will perform initial evaluati
on of pt's status upon admission and devise an individualized program for Caregiver Training

 New precaution - to improve, our physical therapists will perform initial evaluation of pt's status 
upon admission and devise an individualized program for Patient precaution education

 Poor balance - to improve, our physical therapists will perform initial evaluation of pt's status up
on admission and devise an individualized program for Balance Training

 Poor endurance - to improve, our physical therapists will perform initial evaluation of pt's status 
upon admission and devise an individualized program for Endurance Training

 Weakness - to improve, our physical therapists will perform initial evaluation of pt's status upon a
dmission and devise an individualized program for Aquatic Therapy, Neuromuscular Reeducation, and Str
engthening

- Occupational Therapy

 Need for care giver - to improve, our occupation therapists will perform initial evaluation of pt's 
status upon admission and devise an individualized program for Caregiver Training

 Weakness - to improve, our occupation therapists will perform initial evaluation of pt's status upon
 admission and devise an individualized program for Aquatic Therapy, Balance, Endurance, UE ROM, and 
UE strengthening

- Cognition - orientation

 for aphasia

- Dressing

 Status: dep

 for ADL deficits

- Grooming

 Status: min

 for ADL deficits

- Toilet Transfer

 Status: dep

 for ADL deficits

- Bed to Chair Transfer squat pivot transfer

 Status: dep

 for ADL deficits

- Tub Transfer

 Status: dep

 for ADL deficits

- Shower Transfer

 Status: dep

 for ADL deficits

- Wheel Chair to Bed Transfer

 Status: dep

 for ADL deficits

- Other

 See attached MAR (Medication Administration Record)

- Diet Type

Continue Regular

- Diet - Liquid Texture

Continue Regular

- Tube Feed

Continue N/A

- Bladder

 care per protocol

- Weight Bearing Precaution

 WBAT right LE

- Skin

 care per protocol

- Diet - Solid Texture

Continue Regular

- Shower

 allowing shower



 for Dementia, TBI, Stroke, or others

- Balance

 for Weakness

- Bed mobility

 for ADL deficits

- Eating

 for ADL deficits

- Hygiene

 for ADL deficits



FUNCTIONAL STATUS: UPDATED AT WEEKLY TEAM CONFERENCE



- Bladder

Same accident frequency: 7-Ind - No accidents in the past 7 days

- Bowel

Same accident frequency: 7-Ind - No accidents in the past 7 days

- Walking

Same score based on distance walked: 0(N/A)

- Wheelchair

Same score based on distance traveled: 0(N/A)



FUNCTIONAL STATUS:



- Self-Care

A. Eating    Jose   

B. Grooming    sup   

C. Bathing    maxA   

D. Dressing - Upper     modA   

E. Dressing - Lower     maxA   

F. Toileting    maxA   

- Sphincter Control

G. Bladder control     Ross   

H. Bowel control     Ross   

- Transfers Control

I. Bed/Chair/Wheelchair     maxA   

J. Toilet    maxA   

K. Tub/Shower    maxA   

- Locomotion

L. Walk/Wheelchair (B)     maxA   

M. Stairs    ADNO   

- Communication

N. Comprehension (B)     sup   

O. Expression (B)     sup   

- Social Cognition

P. Social Interaction    Jose   

Q. Problem Solving    sup   

R. Memory    sup   

- Endurance

Poor

- Balance

Poor

- Safety Awareness

Poor



QI SCORES:



- Self-Care

A. Eating  03-Partial/moderate assistance  

B. Oral hygiene  02-Substantial/maximal assistance  

C. Toileting hygiene  02-Substantial/maximal assistance  

E. Shower/bathe self  02-Substantial/maximal assistance  

F. Upper body dressing  02-Substantial/maximal assistance  

G. Lower body dressing  02-Substantial/maximal assistance  

H. Putting on/taking off footwear  88-Not attempted due to medical condition or safety concerns  

- Mobility

M. 1 step (curb)  88-Not attempted due to medical condition or safety concerns  

N. 4 steps  88-Not attempted due to medical condition or safety concerns  

O. 12 steps  88-Not attempted due to medical condition or safety concerns  

P. Picking up object  88-Not attempted due to medical condition or safety concerns  

R. Wheel 50 feet with two turns  88-Not attempted due to medical condition or safety concerns  

A. Roll left and right  03-Partial/moderate assistance  

B. Sit to lying  03-Partial/moderate assistance  

C. Lying to sitting on side of bed  03-Partial/moderate assistance  

D. Sit to stand  02-Substantial/maximal assistance  

E. Chair/bed-to-chair transfer  02-Substantial/maximal assistance  

F. Toilet transfer  88-Not attempted due to medical condition or safety concerns  

G. Car transfer  88-Not attempted due to medical condition or safety concerns  

I. Walk 10 feet  88-Not attempted due to medical condition or safety concerns  

J. Walk 50 feet with two turns  88-Not attempted due to medical condition or safety concerns  

K. Walk 150 feet  88-Not attempted due to medical condition or safety concerns  

L. Walking 10 feet on uneven surfaces  88-Not attempted due to medical condition or safety concerns  


S. Wheel 150 feet  88-Not attempted due to medical condition or safety concerns  

- Bladder and Bowel

Bladder continence      

Bowel continence      

- Endurance

Fair

- Balance

Fair

- Safety Awareness

Fair



CURRENT [facility]. DEFICITS:



Endurance, Balance, Self-Care, Safety Awareness, and Mobility



SIGNATURE PANEL:



Electronically signed by Dr. Michael Salinas M.D. on 09/23/2020 at 17:58 (CDT)

## 2020-09-24 NOTE — R.PN
PROGRESS NOTES



ENCOUNTER DATE AND TIME:



09/24/2020 17:26 (CDT)



NAME



BRENDA BRADSHAW



YOB: 1945



DATE OF ADMISSION:



09/10/2020 18:41 (CDT)



Left hemispheric STROKECHIEF COMPLAINT:



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



SUBJECTIVE:



Pt denied any depression.

Pt denied any Shortness of Breath.

CBC with differential is unremarkable except Hgb of 11.5. Glucose 90 to 127. Prealbumin 27.3. UA with
 C and S  showed E-Coli sensitive to Cipro. Treated with Cipro 500 mg bid for 5 days.

Patient states that pain is under control.

ADLs done with supervision to independence.

Ambulated 55' with moderate assistance using a rolling walker.

INR 1.69, will give coumadin 10 mg today then recheck INR in the AM.



VITAL SIGNS



Temperature: 97.1 F

SBP/DBP: 124/49

Pulse: 59

Resp: 16



MEDICATION ALLERGIES:



No Known Drug Allergies (NKDA)



ENVIRONMENTAL ALLERGIES:



- Substance Allergies

None Known

- Other Allergies

None Known



NURSING:



- Shower

allowing shower

- Bladder

care per protocol

- Skin

care per protocol



PRECAUTIONS:



- Weight Bearing Precaution

WBAT right LE



ACTIVITIES



OOB only with supervision



THERAPIES:



- Occupational Therapy

Cognitive Retraining. Visual Perceptual Training. 



- Dietary and Nutrition

Adequate Nutrition. Nutritional Education. Nutritional Supplements. 



- Speech Therapy

Cognitive Training. Expressive Language Skills. Memory Strategies. Receptive Language Skills. Speech 
Intelligibility Training. 



PHYSICAL EXAM



- Gen

Alert and awake

Lying in bed

No apparent distress

Oriented to: person, time, and place

- Skin

No breakdown



No abnormalities

- Eyes

No abnormalities

- ENMT

No abnormalities

- Neck

No abnormalities

- CVS

RRR

- Chest

No abnormalities

- Abd

Soft

- GI

Non distended



Deferred

- 

No abnormalities

- Ext

Mild right lower extremity edema.

- MSK

0/5 weakness in right lower extremity, 4/5 right upper extremity strength.

- Neuro

0/5 weakness in right lower extremity, 4/5 right upper extremity strength.

- Psych

No abnormalities



ASSESSMENT:



Currently, he has deficits of Locomotion, Balance, Transfers Control, Sphincter Control, and Enduranc
e.On 08/31/2020 Pt. presented to [facility] with sudden onset of right-side weakness.On 08/31/202
0 he was admitted to [facility] with diagnosis Left hemispheric STROKE.His impairment category is
 Stroke 01 -  Right Body (Left Brain) (01.2).Pt. is now referred to [facility]
 for acute in-patient rehabilitation in order to maximize patient's functional independence in activi
ties of daily living, strength, ROM, and mobility.Pre-morbidly, Pt. was independent/mod-I in Locomoti
on, Safety Awareness, Social Cognition, Balance, Transfers Control, Self-Care, and Communication; and
 he had good Sphincter Control and Endurance.Pt. is a 75 yo Right-handed male of unknown race.- Rehab
 Goal

Patient has realistic goal of being discharged at assistance level 7-Ind to reside at Home with  Pt s
elf.

MDM/PLAN:



- Physical Therapy

 Edema - to improve, our physical therapists will perform initial evaluation of pt's status upon admi
ssion and devise an individualized program for Elevation Training, and Lymphedema Therapy

 Gait dysfunction - to improve, our physical therapists will perform initial evaluation of pt's statu
s upon admission and devise an individualized program for Gait Training, and Wheel Chair mobility

 Inability to transfer - to improve, our physical therapists will perform initial evaluation of pt's 
status upon admission and devise an individualized program for Bed mobility

 Need for home safety evaluation - to improve, our physical therapists will perform initial evaluatio
n of pt's status upon admission and devise an individualized program for Home Evaluation

 Need in caregiver upon discharge - to improve, our physical therapists will perform initial evaluati
on of pt's status upon admission and devise an individualized program for Caregiver Training

 New precaution - to improve, our physical therapists will perform initial evaluation of pt's status 
upon admission and devise an individualized program for Patient precaution education

 Poor balance - to improve, our physical therapists will perform initial evaluation of pt's status up
on admission and devise an individualized program for Balance Training

 Poor endurance - to improve, our physical therapists will perform initial evaluation of pt's status 
upon admission and devise an individualized program for Endurance Training

 Weakness - to improve, our physical therapists will perform initial evaluation of pt's status upon a
dmission and devise an individualized program for Aquatic Therapy, Neuromuscular Reeducation, and Str
engthening

- Occupational Therapy

 Need for care giver - to improve, our occupation therapists will perform initial evaluation of pt's 
status upon admission and devise an individualized program for Caregiver Training

 Weakness - to improve, our occupation therapists will perform initial evaluation of pt's status upon
 admission and devise an individualized program for Aquatic Therapy, Balance, Endurance, UE ROM, and 
UE strengthening

- Cognition - orientation

 for aphasia

- Dressing

 Status: dep

 for ADL deficits

- Grooming

 Status: min

 for ADL deficits

- Toilet Transfer

 Status: dep

 for ADL deficits

- Bed to Chair Transfer squat pivot transfer

 Status: dep

 for ADL deficits

- Tub Transfer

 Status: dep

 for ADL deficits

- Shower Transfer

 Status: dep

 for ADL deficits

- Wheel Chair to Bed Transfer

 Status: dep

 for ADL deficits

- Other

 See attached MAR (Medication Administration Record)

- Diet Type

Continue Regular

- Diet - Liquid Texture

Continue Regular

- Tube Feed

Continue N/A

- Bladder

 care per protocol

- Weight Bearing Precaution

 WBAT right LE

- Skin

 care per protocol

- Diet - Solid Texture

Continue Regular

- Shower

 allowing shower



 for Dementia, TBI, Stroke, or others

- Balance

 for Weakness

- Bed mobility

 for ADL deficits

- Eating

 for ADL deficits

- Hygiene

 for ADL deficits



FUNCTIONAL STATUS: UPDATED AT WEEKLY TEAM CONFERENCE



- Bladder

Same accident frequency: 7-Ind - No accidents in the past 7 days

- Bowel

Same accident frequency: 7-Ind - No accidents in the past 7 days

- Walking

Same score based on distance walked: 0(N/A)

- Wheelchair

Same score based on distance traveled: 0(N/A)



FUNCTIONAL STATUS:



- Self-Care

A. Eating    Jose   

B. Grooming    sup   

C. Bathing    maxA   

D. Dressing - Upper     modA   

E. Dressing - Lower     maxA   

F. Toileting    maxA   

- Sphincter Control

G. Bladder control     Ross   

H. Bowel control     Ross   

- Transfers Control

I. Bed/Chair/Wheelchair     maxA   

J. Toilet    maxA   

K. Tub/Shower    maxA   

- Locomotion

L. Walk/Wheelchair (B)     maxA   

M. Stairs    ADNO   

- Communication

N. Comprehension (B)     sup   

O. Expression (B)     sup   

- Social Cognition

P. Social Interaction    Jose   

Q. Problem Solving    sup   

R. Memory    sup   

- Endurance

Poor

- Balance

Poor

- Safety Awareness

Poor



QI SCORES:



- Self-Care

A. Eating  03-Partial/moderate assistance  

B. Oral hygiene  02-Substantial/maximal assistance  

C. Toileting hygiene  02-Substantial/maximal assistance  

E. Shower/bathe self  02-Substantial/maximal assistance  

F. Upper body dressing  02-Substantial/maximal assistance  

G. Lower body dressing  02-Substantial/maximal assistance  

H. Putting on/taking off footwear  88-Not attempted due to medical condition or safety concerns  

- Mobility

M. 1 step (curb)  88-Not attempted due to medical condition or safety concerns  

N. 4 steps  88-Not attempted due to medical condition or safety concerns  

O. 12 steps  88-Not attempted due to medical condition or safety concerns  

P. Picking up object  88-Not attempted due to medical condition or safety concerns  

R. Wheel 50 feet with two turns  88-Not attempted due to medical condition or safety concerns  

A. Roll left and right  03-Partial/moderate assistance  

B. Sit to lying  03-Partial/moderate assistance  

C. Lying to sitting on side of bed  03-Partial/moderate assistance  

D. Sit to stand  02-Substantial/maximal assistance  

E. Chair/bed-to-chair transfer  02-Substantial/maximal assistance  

F. Toilet transfer  88-Not attempted due to medical condition or safety concerns  

G. Car transfer  88-Not attempted due to medical condition or safety concerns  

I. Walk 10 feet  88-Not attempted due to medical condition or safety concerns  

J. Walk 50 feet with two turns  88-Not attempted due to medical condition or safety concerns  

K. Walk 150 feet  88-Not attempted due to medical condition or safety concerns  

L. Walking 10 feet on uneven surfaces  88-Not attempted due to medical condition or safety concerns  


S. Wheel 150 feet  88-Not attempted due to medical condition or safety concerns  

- Bladder and Bowel

Bladder continence      

Bowel continence      

- Endurance

Fair

- Balance

Fair

- Safety Awareness

Fair



CURRENT [facility]. DEFICITS:



Endurance, Balance, Self-Care, Safety Awareness, and Mobility



SIGNATURE PANEL:



Electronically signed by Dr. Michael Salinas M.D. on 09/24/2020 at 17:30 (CDT)

## 2020-09-25 NOTE — P.RH.PN
Estimated Length of Stay: 24


Expected Discharge Date: 10/03/20


Discharge Disposition Plan: Home


Family Support: Yes


Long Term Goal: Mobility, Transfers, Self Care


Vital Signs: 


                                Last Vital Signs











Temp  97.3 F   09/25/20 07:54


 


Pulse  62   09/25/20 08:05


 


Resp  16   09/25/20 07:54


 


BP  123/44 L  09/25/20 08:05


 


Pulse Ox  97   09/25/20 07:54











Laboratory: 


                             Laboratory Last Values











WBC  5.6 K/uL (4.3-10.9)   09/24/20  06:22    


 


RBC  3.99 M/uL (4.33-5.43)  L  09/24/20  06:22    


 


Hgb  11.5 g/dL (13.6-17.9)  L  09/24/20  06:22    


 


Hct  33.5 % (39.6-49.0)  L  09/24/20  06:22    


 


MCV  83.8 fL ()   09/24/20  06:22    


 


MCH  28.9 pg (27.0-35.0)   09/24/20  06:22    


 


MCHC  34.5 g/dL (32.0-36.0)   09/24/20  06:22    


 


RDW  14.6 % (12.1-15.2)   09/24/20  06:22    


 


Plt Count  115 K/uL (152-406)  L  09/24/20  06:22    


 


MPV  9.3 fL (7.6-11.3)   09/24/20  06:22    


 


Neutrophils %  64.2 % (41.7-73.7)   09/24/20  06:22    


 


Lymphocytes %  24.7 % (15.3-44.8)   09/24/20  06:22    


 


Monocytes %  8.3 % (3.3-12.3)   09/24/20  06:22    


 


Eosinophils %  2.3 % (0-4.4)   09/24/20  06:22    


 


Basophils %  0.5 % (0-1.3)   09/24/20  06:22    


 


Absolute Neutrophils  3.6 K/uL (1.8-8.0)   09/24/20  06:22    


 


Absolute Lymphocytes  1.4 K/uL (0.7-4.9)   09/24/20  06:22    


 


Absolute Monocytes  0.5 K/uL (0.1-1.3)   09/24/20  06:22    


 


Absolute Eosinophils  0.1 K/uL (0-0.5)   09/24/20  06:22    


 


Absolute Basophils  0.0 K/uL (0-0.5)   09/24/20  06:22    


 


Platelet Estimate  Decr   09/11/20  06:00    


 


Giant Platelets  Present   09/11/20  06:00    


 


Morphology Comment  Not seen  (NOT SEEN)   09/11/20  06:00    


 


PT  22.7 SECONDS (9.5-12.5)  H  09/25/20  06:27    


 


INR  1.95   09/25/20  06:27    


 


Sodium  142 mmol/L (136-145)   09/24/20  06:22    


 


Potassium  4.0 mmol/L (3.5-5.1)   09/24/20  06:22    


 


Chloride  110 mmol/L ()  H  09/24/20  06:22    


 


Carbon Dioxide  27 mmol/L (21-32)   09/24/20  06:22    


 


BUN  14 mg/dL (7-18)   09/24/20  06:22    


 


Creatinine  1.00 mg/dL (0.55-1.3)   09/24/20  06:22    


 


Estimated GFR  73 mL/min (=/>90)  L  09/24/20  06:22    


 


Glucose  125 mg/dL ()  H  09/24/20  06:22    


 


POC Glucose  103 mg/dL ()   09/25/20  07:15    


 


Hemoglobin A1c  6.6 % (4.2-6.3)  H  09/21/20  06:22    


 


Calcium  8.3 mg/dL (8.5-10.1)  L  09/24/20  06:22    


 


Magnesium  2.1 mg/dL (1.8-2.4)   09/24/20  06:22    


 


Albumin  2.7 g/dL (3.4-5.0)  L  09/24/20  06:22    


 


Prealbumin  21.8 mg/dL (20-40)   09/24/20  06:22    


 


Urine Color  Yellow   09/10/20  22:25    


 


Urine Appearance  Clear   09/10/20  22:25    


 


Urine pH  6.0  (5.0-7.0)   09/10/20  22:25    


 


Ur Specific Gravity  >=1.030  (1.005-1.030)   09/10/20  22:25    


 


Glucose (UA)(Auto)  1+  (NEG)  H  09/10/20  22:25    


 


Urine Ketones  Negative  (NEG)   09/10/20  22:25    


 


Urine Blood  Negative  (NEG)   09/10/20  22:25    


 


Urine Nitrite  Negative  (NEG)   09/10/20  22:25    


 


Urine Bilirubin  Negative  (NEG)   09/10/20  22:25    


 


Urine Urobilinogen  1.0 mg/dL (0.2-1.0)   09/10/20  22:25    


 


Ur Leukocyte Esterase  Negative  (NEG)   09/10/20  22:25    


 


Urine RBC  <5 /HPF (NONE SEEN)   09/10/20  22:25    


 


Urine WBC  <5 /HPF (<5)   09/10/20  22:25    


 


Ur Squamous Epith Cells  5-10 /HPF (NONE SEEN)  H  09/10/20  22:25    


 


Calcium Oxalate Crystal  Many  (NONE SEEN)  H  09/10/20  22:25    


 


Urine Bacteria  <20 /HPF (NONE SEEN)   09/10/20  22:25    


 


Urine Culture Reflexed  Not needed   09/10/20  22:25    


 


Urine Total Protein  Negative  (NEG)   09/10/20  22:25    


 


SARS-CoV-2 RNA (RT-PCR)  Negative  (NEGATIVE)   09/11/20  22:00    


 


Smear Scan  Ok  (OK)   09/11/20  06:00    











Weight: 210 lb 9.6 oz


Wound Present: No


Closed Surgical Incision Present: No


Negative Pressure Wound Therapy Present: No


Physician Update: Labs reviewed and are stable. INR is 1.95. Will go to coumadin

7 mg daily. He is making fair overall progress with physical and occupational 

therapy. His right leg still requires moderate assistance for walking 30' with 

the walker. He does well with verbal ques. His right leg pushs to the right when

walking. His short term memory is still a challenge.


Medical Issues: HX INCLUDES HTN, TOBACCO ABUSE, THROMBOCYTOPENIA.


Pain Issues: TAKING TYLENOL FOR PAIN.


Nutritional Needs: ADA 1800 DIET


Comment: Redness on the bilateral groins and buttocks, nystatin powder scheduled


Functional Improvement: Patient has met all short-term goals at this time, and 

is progressing well toward long-term goals.  Patient is currently ambulating in 

hallway w/ RW w/ Mod A.


Summary: Patient's care plan and long term goals have been reviewed and revised 

as necessary. Please see the Rehabilitation Signature page for all necessary 

signatures.

## 2020-09-25 NOTE — FAST
OT QI REPORT FORM



ENCOUNTER DATE AND TIME:



09/25/2020 08:00 (CDT)



NAME



BRENDA BRADSHAW



YOB: 1945



DATE OF ADMISSION:



09/10/2020 18:41 (CDT)



PHONE:



[phone]



AGE:



74



N#



[id number]



GENDER:



Male



ENCOUNTER PHYSICIAN:



Dr. Michael Salinas M.D.



ADMISSION DIAGNOSIS:



- Stroke 01 -  Right Body (Left Brain) (01.2)

Left hemispheric STROKE. 



EATING:



Not assessed/no information 



CODE:



-  



ORAL HYGIENE:



Not assessed/no information 



CODE:



-  



TOILETING HYGIENE:



Not assessed/no information 



CODE:



-  



BATHING:



SHOWER/BATHE SELF - STEP 1:



Does the patient complete the activity by him/herself with no assistance (physical, verbal/nonverbal 
cueing, setup/clean-up)? No.



SHOWER/BATHE SELF - STEP 2:



Does the patient need only setup/clean-up assistance from one helper? No.



SHOWER/BATHE SELF - STEP 3:



Does the patient need only verbal/nonverbal cueing or touching/steadying/contact guard assistance fro
m one helper? No.



SHOWER/BATHE SELF - STEP 4:



Does the patient need physical assistance - for example lifting or trunk support from one helper - wi
th the helper providing less than half of the effort? Yes.



1. [id number] ADMISSION PERFORMANCE:



Partial/moderate assistance   



CODE:



03  



DRESSING - UPPER BODY:



DRESSING - UPPER BODY - STEP 1:



Does the patient complete the activity by him/herself with no assistance (physical, verbal/nonverbal 
cueing, setup/clean-up)? No.



DRESSING - UPPER BODY - STEP 2:



Does the patient need only setup/clean-up assistance from one helper? No.



DRESSING - UPPER BODY - STEP 3:



Does the patient need only verbal/nonverbal cueing or touching/steadying/contact guard assistance fro
m one helper? Yes.



1. [id number] ADMISSION PERFORMANCE:



Supervision or touching assistance   



CODE:



04  



DRESSING - LOWER BODY:



DRESSING - LOWER BODY - STEP 1:



Does the patient complete the activity by him/herself with no assistance (physical, verbal/nonverbal 
cueing, setup/clean-up)? No.



DRESSING - LOWER BODY - STEP 2:



Does the patient need only setup/clean-up assistance from one helper? No.



DRESSING - LOWER BODY - STEP 3:



Does the patient need only verbal/nonverbal cueing or touching/steadying/contact guard assistance fro
m one helper? No.



DRESSING - LOWER BODY - STEP 4:



Does the patient need physical assistance - for example lifting or trunk support from one helper - wi
th the helper providing less than half of the effort? Yes.



1. [id number] ADMISSION PERFORMANCE:



Partial/moderate assistance   



CODE:



03  



PUTTING ON/TAKING OFF FOOTWEAR:



FOOTWEAR - STEP 1:



Does the patient complete the activity by him/herself with no assistance (physical, verbal/nonverbal 
cueing, setup/clean-up)? No.



FOOTWEAR - STEP 2:



Does the patient need only setup/clean-up assistance from one helper? No.



FOOTWEAR - STEP 3:



Does the patient need only verbal/nonverbal cueing or touching/steadying/contact guard assistance fro
m one helper? No.



FOOTWEAR - STEP 4:



Does the patient need physical assistance - for example lifting or trunk support from one helper - wi
th the helper providing less than half of the effort? Yes.



1. [id number] ADMISSION PERFORMANCE:



Partial/moderate assistance   



CODE:



03  



DOES THE PATIENT USE A WHEELCHAIR/SCOOTER?



CODE:



EXPR  



INDICATE THE TYPE OF WHEELCHAIR/SCOOTER USED:



CODE:



EXPR  



INDICATE THE TYPE OF WHEELCHAIR/SCOOTER USED:



CODE:



EXPR  



BLADDER AND BOWEL:



CODE:



EXPR  



CODE:



EXPR  



SIGNATURE PANEL:



The following modified sections: 1. [id number] Admission Performance, 1. [id number] Admission Performance, 
1. [id number] Admission Performance, 1. [id number] Admission Performance were [electronically] signed by Christal Fine OT on Fri Sep 25 2020 11:40:10 GMT-0500 (Central Daylight Time)

## 2020-09-26 NOTE — FAST
QUALITY INDICATORS FORM



SHIFT START DATE/TIME:



09/26/2020 07:00 (CDT)



SHIFT END DATE/TIME:



09/26/2020 19:00 (CDT)



NAME



BRENDA BRADSHAW



YOB: 1945



DATE OF ADMISSION:



09/10/2020 18:41 (CDT)



PHONE:



[phone]



AGE:



74



N#



[id number]



GENDER:



Male



ENCOUNTER PHYSICIAN:



Dr. Michael Salinas M.D.



ADMISSION DIAGNOSIS:



- Stroke 01 -  Right Body (Left Brain) (01.2)

Left hemispheric STROKE. 



EATING:



EATING - STEP 1:



Does the patient complete the activity by him/herself with no assistance (physical, verbal/nonverbal 
cueing, setup/clean-up)? No.



EATING - STEP 2:



Does the patient need only setup/clean-up assistance from one helper? Yes.



1. [id number] ADMISSION PERFORMANCE:



Setup or clean-up assistance   



CODE:



05  



ORAL HYGIENE:



ORAL HYGIENE - STEP 1:



Does the patient complete the activity by him/herself with no assistance (physical, verbal/nonverbal 
cueing, setup/clean-up)? No.



ORAL HYGIENE - STEP 2:



Does the patient need only setup/clean-up assistance from one helper? Yes.



1. [id number] ADMISSION PERFORMANCE:



Setup or clean-up assistance   



CODE:



05  



TOILETING HYGIENE:



TOILETING HYGIENE - STEP 1:



Does the patient complete the activity by him/herself with no assistance (physical, verbal/nonverbal 
cueing, setup/clean-up)? No.



TOILETING HYGIENE - STEP 2:



Does the patient need only setup/clean-up assistance from one helper? No.



TOILETING HYGIENE - STEP 3:



Does the patient need only verbal/nonverbal cueing or touching/steadying/contact guard assistance fro
m one helper? No.



TOILETING HYGIENE - STEP 4:



Does the patient need physical assistance - for example lifting or trunk support from one helper - wi
th the helper providing less than half of the effort? No.



TOILETING HYGIENE - STEP 5:



Does the patient need physical assistance - for example lifting or trunk support from one helper - wi
th the helper providing more than half of the effort? Yes.



1. [id number] ADMISSION PERFORMANCE:



Substantial/maximal assistance   



CODE:



02  



BATHING:



Not assessed/no information 



CODE:



-  



DRESSING - UPPER BODY:



DRESSING - UPPER BODY - STEP 1:



Does the patient complete the activity by him/herself with no assistance (physical, verbal/nonverbal 
cueing, setup/clean-up)? No.



DRESSING - UPPER BODY - STEP 2:



Does the patient need only setup/clean-up assistance from one helper? No.



DRESSING - UPPER BODY - STEP 3:



Does the patient need only verbal/nonverbal cueing or touching/steadying/contact guard assistance fro
m one helper? Yes.



1. [id number] ADMISSION PERFORMANCE:



Supervision or touching assistance   



CODE:



04  



DRESSING - LOWER BODY:



DRESSING - LOWER BODY - STEP 1:



Does the patient complete the activity by him/herself with no assistance (physical, verbal/nonverbal 
cueing, setup/clean-up)? No.



DRESSING - LOWER BODY - STEP 2:



Does the patient need only setup/clean-up assistance from one helper? No.



DRESSING - LOWER BODY - STEP 3:



Does the patient need only verbal/nonverbal cueing or touching/steadying/contact guard assistance fro
m one helper? No.



DRESSING - LOWER BODY - STEP 4:



Does the patient need physical assistance - for example lifting or trunk support from one helper - wi
th the helper providing less than half of the effort? Yes.



1. [id number] ADMISSION PERFORMANCE:



Partial/moderate assistance   



CODE:



03  



PUTTING ON/TAKING OFF FOOTWEAR:



FOOTWEAR - STEP 1:



Does the patient complete the activity by him/herself with no assistance (physical, verbal/nonverbal 
cueing, setup/clean-up)? No.



FOOTWEAR - STEP 2:



Does the patient need only setup/clean-up assistance from one helper? No.



FOOTWEAR - STEP 3:



Does the patient need only verbal/nonverbal cueing or touching/steadying/contact guard assistance fro
m one helper? No.



FOOTWEAR - STEP 4:



Does the patient need physical assistance - for example lifting or trunk support from one helper - wi
th the helper providing less than half of the effort? No.



FOOTWEAR - STEP 5:



Does the patient need physical assistance - for example lifting or trunk support from one helper - wi
th the helper providing more than half of the effort? Yes.



1. [id number] ADMISSION PERFORMANCE:



Substantial/maximal assistance   



CODE:



02  



ROLL LEFT AND RIGHT:



ROLL LEFT AND RIGHT - STEP 1:



Does the patient complete the activity by him/herself with no assistance (physical, verbal/nonverbal 
cueing, setup/clean-up)? No.



ROLL LEFT AND RIGHT - STEP 2:



Does the patient need only setup/clean-up assistance from one helper? No.



ROLL LEFT AND RIGHT - STEP 3:



Does the patient need only verbal/nonverbal cueing or touching/steadying/contact guard assistance fro
m one helper? No.



ROLL LEFT AND RIGHT - STEP 4:



Does the patient need physical assistance - for example lifting or trunk support from one helper - wi
th the helper providing less than half of the effort? Yes.



1. [id number] ADMISSION PERFORMANCE:



Partial/moderate assistance   



CODE:



03  



SIT TO LYING:



SIT TO LYING - STEP 1:



Does the patient complete the activity by him/herself with no assistance (physical, verbal/nonverbal 
cueing, setup/clean-up)? No.



SIT TO LYING - STEP 2:



Does the patient need only setup/clean-up assistance from one helper? No.



SIT TO LYING - STEP 3:



Does the patient need only verbal/nonverbal cueing or touching/steadying/contact guard assistance fro
m one helper? No.



SIT TO LYING - STEP 4:



Does the patient need physical assistance - for example lifting or trunk support from one helper - wi
th the helper providing less than half of the effort? Yes.



1. [id number] ADMISSION PERFORMANCE:



Partial/moderate assistance   



CODE:



03  



LYING TO SITTING:



LYING TO SITTING ON SIDE OF BED - STEP 1:



Does the patient complete the activity by him/herself with no assistance (physical, verbal/nonverbal 
cueing, setup/clean-up)? No.



LYING TO SITTING ON SIDE OF BED - STEP 2:



Does the patient need only setup/clean-up assistance from one helper? No.



LYING TO SITTING ON SIDE OF BED - STEP 3:



Does the patient need only verbal/nonverbal cueing or touching/steadying/contact guard assistance fro
m one helper? No.



LYING TO SITTING ON SIDE OF BED - STEP 4:



Does the patient need physical assistance - for example lifting or trunk support from one helper - wi
th the helper providing less than half of the effort? Yes.



1. [id number] ADMISSION PERFORMANCE:



Partial/moderate assistance   



CODE:



03  



SIT TO STAND:



SIT TO STAND - STEP 1:



Does the patient complete the activity by him/herself with no assistance (physical, verbal/nonverbal 
cueing, setup/clean-up)? No.



SIT TO STAND - STEP 2:



Does the patient need only setup/clean-up assistance from one helper? No.



SIT TO STAND - STEP 3:



Does the patient need only verbal/nonverbal cueing or touching/steadying/contact guard assistance fro
m one helper? No.



SIT TO STAND - STEP 4:



Does the patient need physical assistance - for example lifting or trunk support from one helper - wi
th the helper providing less than half of the effort? Yes.



1. [id number] ADMISSION PERFORMANCE:



Partial/moderate assistance   



CODE:



03  



TRANSFERS: BED, CHAIR:



CHAIR/BED-TO-CHAIR TRANSFER - STEP 1:



Does the patient complete the activity by him/herself with no assistance (physical, verbal/nonverbal 
cueing, setup/clean-up)? No.



CHAIR/BED-TO-CHAIR TRANSFER - STEP 2:



Does the patient need only setup/clean-up assistance from one helper? No.



CHAIR/BED-TO-CHAIR TRANSFER - STEP 3:



Does the patient need only verbal/nonverbal cueing or touching/steadying/contact guard assistance fro
m one helper? No.



CHAIR/BED-TO-CHAIR TRANSFER - STEP 4:



Does the patient need physical assistance - for example lifting or trunk support from one helper - wi
th the helper providing less than half of the effort? Yes.



1. [id number] ADMISSION PERFORMANCE:



Partial/moderate assistance   



CODE:



03  



TRANSFER TOILET:



TOILET TRANSFER - STEP 1:



Does the patient complete the activity by him/herself with no assistance (physical, verbal/nonverbal 
cueing, setup/clean-up)? No.



TOILET TRANSFER - STEP 2:



Does the patient need only setup/clean-up assistance from one helper? No.



TOILET TRANSFER - STEP 3:



Does the patient need only verbal/nonverbal cueing or touching/steadying/contact guard assistance fro
m one helper? No.



TOILET TRANSFER - STEP 4:



Does the patient need physical assistance - for example lifting or trunk support from one helper - wi
th the helper providing less than half of the effort? Yes.



1. [id number] ADMISSION PERFORMANCE:



Partial/moderate assistance   



CODE:



03  



TRANSFERS: CAR:



Not assessed/no information 



CODE:



-  



WALK 10 FEET:



Not assessed/no information 



CODE:



-   



1 STEP (CURB):



Not assessed/no information 



CODE:



-   



PICKING UP OBJECT:



Not assessed/no information 



CODE:



-  



DOES THE PATIENT USE A WHEELCHAIR/SCOOTER?



CODE:



EXPR  



WHEEL 50 FEET WITH TWO TURNS:



WHEEL 50 FEET WITH TWO TURNS - STEP 1:



Does the patient complete the activity by him/herself with no assistance (physical, verbal/nonverbal 
cueing, setup/clean-up)? No.



WHEEL 50 FEET WITH TWO TURNS - STEP 2:



Does the patient need only setup/clean-up assistance from one helper? No.



WHEEL 50 FEET WITH TWO TURNS - STEP 3:



Does the patient need only verbal/nonverbal cueing or touching/steadying/contact guard assistance fro
m one helper? Yes.



1. [id number] ADMISSION PERFORMANCE:



Supervision or touching assistance   



CODE:



04  



INDICATE THE TYPE OF WHEELCHAIR/SCOOTER USED:



RR1. INDICATE THE TYPE OF WHEELCHAIR/SCOOTER USED.:



Manual   



CODE:



1  



WHEEL 150 FEET:



WHEEL 150 FEET - STEP 1:



Does the patient complete the activity by him/herself with no assistance (physical, verbal/nonverbal 
cueing, setup/clean-up)? No.



WHEEL 150 FEET - STEP 2:



Does the patient need only setup/clean-up assistance from one helper? No.



WHEEL 150 FEET - STEP 3:



Does the patient need only verbal/nonverbal cueing or touching/steadying/contact guard assistance fro
m one helper? Yes.



1. [id number] ADMISSION PERFORMANCE:



Supervision or touching assistance   



CODE:



04  



INDICATE THE TYPE OF WHEELCHAIR/SCOOTER USED:



SS1. INDICATE THE TYPE OF WHEELCHAIR/SCOOTER USED.:



Manual   



CODE:



1  



BLADDER AND BOWEL:



H350. BLADDER CONTINENCE (3-DAY ASSESSMENT PERIOD):



Incontinent daily (at least once a day)   



CODE:



3  



H400. BOWEL CONTINENCE (3-DAY ASSESSMENT PERIOD):



Always continent   



CODE:



0  



SIGNATURE PANEL:



The following modified sections: 1. [id number] Admission Performance, 1. [id number] Admission Performance, 
1. [id number] Admission Performance, 1. [id number] Admission Performance, 1. AM4389r Admission Performance,
 1. JZ1677v Admission Performance, 1. OH6704Y Admission Performance, 1. SG2959P Admission Performance
, 1. JM2838E Admission Performance, 1. VG1133X Admission Performance, 1. UP8152T Admission Performanc
e, 1. IP2975A Admission Performance, 1. PO6223M Admission Performance, 1. GM3005D Admission Performan
ce, RR1. Indicate the type of wheelchair/scooter used., 1. FX5049Z Admission Performance, Code, SS1. 
Indicate the type of wheelchair/scooter used., H350. Bladder Continence (3-day assessment period), H4
00. Bowel Continence (3-day assessment period), 1. HW7946P Admission Performance, 1. GA1540R Admissio
n Performance were [electronically] signed by DEX BaumannNREGINA on Sat Sep 26 2020 14:16:12 GMT-05
00 (Central Daylight Time)

## 2020-09-28 NOTE — R.PN
PROGRESS NOTES



ENCOUNTER DATE AND TIME:



09/28/2020 17:37 (CDT)



NAME



BRENDA BRADSHAW



YOB: 1945



DATE OF ADMISSION:



09/10/2020 18:41 (CDT)



Left hemispheric STROKECHIEF COMPLAINT:



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



SUBJECTIVE:



Pt denied any depression.

Pt denied any Shortness of Breath.

CBC with differential is unremarkable except Hgb of 11.5. Glucose 90 to 127. Prealbumin 27.3. UA with
 C and S  showed E-Coli sensitive to Cipro. Treated with Cipro 500 mg bid for 5 days.

Patient states that pain is under control.

ADLs done with supervision to independence.

Ambulated 35' with moderate to maximum assistance using a rolling walker.

INR 1.92, will give coumadin 8 mg today then recheck INR in the AM.



VITAL SIGNS



Temperature: 98.1 F

SBP/DBP: 109/42

Pulse: 64

Resp: 16



MEDICATION ALLERGIES:



No Known Drug Allergies (NKDA)



ENVIRONMENTAL ALLERGIES:



- Substance Allergies

None Known

- Other Allergies

None Known



NURSING:



- Shower

allowing shower

- Bladder

care per protocol

- Skin

care per protocol



PRECAUTIONS:



- Weight Bearing Precaution

WBAT right LE



ACTIVITIES



OOB only with supervision



THERAPIES:



- Occupational Therapy

Cognitive Retraining. Visual Perceptual Training. 



- Dietary and Nutrition

Adequate Nutrition. Nutritional Education. Nutritional Supplements. 



- Speech Therapy

Cognitive Training. Expressive Language Skills. Memory Strategies. Receptive Language Skills. Speech 
Intelligibility Training. 



PHYSICAL EXAM



- Gen

Alert and awake

Lying in bed

No apparent distress

Oriented to: person, time, and place

- Skin

No breakdown



No abnormalities

- Eyes

No abnormalities

- ENMT

No abnormalities

- Neck

No abnormalities

- CVS

RRR

- Chest

No abnormalities

- Abd

Soft

- GI

Non distended



Deferred

- 

No abnormalities

- Ext

Mild right lower extremity edema.

- MSK

0/5 weakness in right lower extremity, 4/5 right upper extremity strength.

- Neuro

0/5 weakness in right lower extremity, 4/5 right upper extremity strength.

- Psych

No abnormalities



ASSESSMENT:



Currently, he has deficits of Locomotion, Balance, Transfers Control, Sphincter Control, and Enduranc
e.On 08/31/2020 Pt. presented to [facility] with sudden onset of right-side weakness.On 08/31/202
0 he was admitted to [facility] with diagnosis Left hemispheric STROKE.His impairment category is
 Stroke 01 -  Right Body (Left Brain) (01.2).Pt. is now referred to [facility]
 for acute in-patient rehabilitation in order to maximize patient's functional independence in activi
ties of daily living, strength, ROM, and mobility.Pre-morbidly, Pt. was independent/mod-I in Locomoti
on, Safety Awareness, Social Cognition, Balance, Transfers Control, Self-Care, and Communication; and
 he had good Sphincter Control and Endurance.Pt. is a 75 yo Right-handed male of unknown race.- Rehab
 Goal

Patient has realistic goal of being discharged at assistance level 7-Ind to reside at Home with  Pt s
elf.

MDM/PLAN:



- Physical Therapy

 Edema - to improve, our physical therapists will perform initial evaluation of pt's status upon admi
ssion and devise an individualized program for Elevation Training, and Lymphedema Therapy

 Gait dysfunction - to improve, our physical therapists will perform initial evaluation of pt's statu
s upon admission and devise an individualized program for Gait Training, and Wheel Chair mobility

 Inability to transfer - to improve, our physical therapists will perform initial evaluation of pt's 
status upon admission and devise an individualized program for Bed mobility

 Need for home safety evaluation - to improve, our physical therapists will perform initial evaluatio
n of pt's status upon admission and devise an individualized program for Home Evaluation

 Need in caregiver upon discharge - to improve, our physical therapists will perform initial evaluati
on of pt's status upon admission and devise an individualized program for Caregiver Training

 New precaution - to improve, our physical therapists will perform initial evaluation of pt's status 
upon admission and devise an individualized program for Patient precaution education

 Poor balance - to improve, our physical therapists will perform initial evaluation of pt's status up
on admission and devise an individualized program for Balance Training

 Poor endurance - to improve, our physical therapists will perform initial evaluation of pt's status 
upon admission and devise an individualized program for Endurance Training

 Weakness - to improve, our physical therapists will perform initial evaluation of pt's status upon a
dmission and devise an individualized program for Aquatic Therapy, Neuromuscular Reeducation, and Str
engthening

- Occupational Therapy

 Need for care giver - to improve, our occupation therapists will perform initial evaluation of pt's 
status upon admission and devise an individualized program for Caregiver Training

 Weakness - to improve, our occupation therapists will perform initial evaluation of pt's status upon
 admission and devise an individualized program for Aquatic Therapy, Balance, Endurance, UE ROM, and 
UE strengthening

- Cognition - orientation

 for aphasia

- Dressing

 Status: dep

 for ADL deficits

- Grooming

 Status: min

 for ADL deficits

- Toilet Transfer

 Status: dep

 for ADL deficits

- Bed to Chair Transfer squat pivot transfer

 Status: dep

 for ADL deficits

- Tub Transfer

 Status: dep

 for ADL deficits

- Shower Transfer

 Status: dep

 for ADL deficits

- Wheel Chair to Bed Transfer

 Status: dep

 for ADL deficits

- Other

 See attached MAR (Medication Administration Record)

- Diet Type

Continue Regular

- Diet - Liquid Texture

Continue Regular

- Tube Feed

Continue N/A

- Bladder

 care per protocol

- Weight Bearing Precaution

 WBAT right LE

- Skin

 care per protocol

- Diet - Solid Texture

Continue Regular

- Shower

 allowing shower



 for Dementia, TBI, Stroke, or others

- Balance

 for Weakness

- Bed mobility

 for ADL deficits

- Eating

 for ADL deficits

- Hygiene

 for ADL deficits



FUNCTIONAL STATUS: UPDATED AT WEEKLY TEAM CONFERENCE



- Bladder

Same accident frequency: 7-Ind - No accidents in the past 7 days

- Bowel

Same accident frequency: 7-Ind - No accidents in the past 7 days

- Walking

Same score based on distance walked: 0(N/A)

- Wheelchair

Same score based on distance traveled: 0(N/A)



FUNCTIONAL STATUS:



- Self-Care

A. Eating    Jose   

B. Grooming    sup   

C. Bathing    maxA   

D. Dressing - Upper     modA   

E. Dressing - Lower     maxA   

F. Toileting    maxA   

- Sphincter Control

G. Bladder control     Ross   

H. Bowel control     Ross   

- Transfers Control

I. Bed/Chair/Wheelchair     maxA   

J. Toilet    maxA   

K. Tub/Shower    maxA   

- Locomotion

L. Walk/Wheelchair (B)     maxA   

M. Stairs    ADNO   

- Communication

N. Comprehension (B)     sup   

O. Expression (B)     sup   

- Social Cognition

P. Social Interaction    Jose   

Q. Problem Solving    sup   

R. Memory    sup   

- Endurance

Poor

- Balance

Poor

- Safety Awareness

Poor



QI SCORES:



- Self-Care

A. Eating  03-Partial/moderate assistance  

B. Oral hygiene  02-Substantial/maximal assistance  

C. Toileting hygiene  02-Substantial/maximal assistance  

E. Shower/bathe self  02-Substantial/maximal assistance  

F. Upper body dressing  02-Substantial/maximal assistance  

G. Lower body dressing  02-Substantial/maximal assistance  

H. Putting on/taking off footwear  88-Not attempted due to medical condition or safety concerns  

- Mobility

M. 1 step (curb)  88-Not attempted due to medical condition or safety concerns  

N. 4 steps  88-Not attempted due to medical condition or safety concerns  

O. 12 steps  88-Not attempted due to medical condition or safety concerns  

P. Picking up object  88-Not attempted due to medical condition or safety concerns  

R. Wheel 50 feet with two turns  88-Not attempted due to medical condition or safety concerns  

A. Roll left and right  03-Partial/moderate assistance  

B. Sit to lying  03-Partial/moderate assistance  

C. Lying to sitting on side of bed  03-Partial/moderate assistance  

D. Sit to stand  02-Substantial/maximal assistance  

E. Chair/bed-to-chair transfer  02-Substantial/maximal assistance  

F. Toilet transfer  88-Not attempted due to medical condition or safety concerns  

G. Car transfer  88-Not attempted due to medical condition or safety concerns  

I. Walk 10 feet  88-Not attempted due to medical condition or safety concerns  

J. Walk 50 feet with two turns  88-Not attempted due to medical condition or safety concerns  

K. Walk 150 feet  88-Not attempted due to medical condition or safety concerns  

L. Walking 10 feet on uneven surfaces  88-Not attempted due to medical condition or safety concerns  


S. Wheel 150 feet  88-Not attempted due to medical condition or safety concerns  

- Bladder and Bowel

Bladder continence      

Bowel continence      

- Endurance

Fair

- Balance

Fair

- Safety Awareness

Fair



CURRENT [facility] DEFICITS:



Endurance, Balance, Self-Care, Safety Awareness, and Mobility



SIGNATURE PANEL:



Electronically signed by Dr. Michael Salinas M.D. on 09/28/2020 at 17:41 (CDT)

## 2020-09-29 NOTE — R.PN
PROGRESS NOTES



ENCOUNTER DATE AND TIME:



09/29/2020 17:08 (CDT)



NAME



BRENDA BRADSHAW



YOB: 1945



DATE OF ADMISSION:



09/10/2020 18:41 (CDT)



Left hemispheric STROKECHIEF COMPLAINT:



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



SUBJECTIVE:



Pt denied any depression.

Pt denied any Shortness of Breath.

CBC with differential is unremarkable except Hgb of 11.5. Glucose 90 to 102. Prealbumin 27.3. UA with
 C and S  showed E-Coli sensitive to Cipro. Treated with Cipro 500 mg bid for 5 days.

Patient states that pain is under control.

ADLs done with supervision to independence.

Ambulated 65' with moderate to moderate assistance using a rolling walker.

INR 2.48, will give coumadin 8 mg today then recheck INR in the AM.



VITAL SIGNS



Temperature: 97.2 F

SBP/DBP: 105/44

Pulse: 62

Resp: 16



MEDICATION ALLERGIES:



No Known Drug Allergies (NKDA)



ENVIRONMENTAL ALLERGIES:



- Substance Allergies

None Known

- Other Allergies

None Known



NURSING:



- Shower

allowing shower

- Bladder

care per protocol

- Skin

care per protocol



PRECAUTIONS:



- Weight Bearing Precaution

WBAT right LE



ACTIVITIES



OOB only with supervision



THERAPIES:



- Occupational Therapy

Cognitive Retraining. Visual Perceptual Training. 



- Dietary and Nutrition

Adequate Nutrition. Nutritional Education. Nutritional Supplements. 



- Speech Therapy

Cognitive Training. Expressive Language Skills. Memory Strategies. Receptive Language Skills. Speech 
Intelligibility Training. 



PHYSICAL EXAM



- Gen

Alert and awake

Lying in bed

No apparent distress

Oriented to: person, time, and place

- Skin

No breakdown



No abnormalities

- Eyes

No abnormalities

- ENMT

No abnormalities

- Neck

No abnormalities

- CVS

RRR

- Chest

No abnormalities

- Abd

Soft

- GI

Non distended



Deferred

- 

No abnormalities

- Ext

Mild right lower extremity edema.

- MSK

0/5 weakness in right lower extremity, 4/5 right upper extremity strength.

- Neuro

0/5 weakness in right lower extremity, 4/5 right upper extremity strength.

- Psych

No abnormalities



ASSESSMENT:



Currently, he has deficits of Locomotion, Balance, Transfers Control, Sphincter Control, and Enduranc
e.On 08/31/2020 Pt. presented to [facility] with sudden onset of right-side weakness.On 08/31/202
0 he was admitted to [facility] with diagnosis Left hemispheric STROKE.His impairment category is
 Stroke 01 -  Right Body (Left Brain) (01.2).Pt. is now referred to [facility]
 for acute in-patient rehabilitation in order to maximize patient's functional independence in activi
ties of daily living, strength, ROM, and mobility.Pre-morbidly, Pt. was independent/mod-I in Locomoti
on, Safety Awareness, Social Cognition, Balance, Transfers Control, Self-Care, and Communication; and
 he had good Sphincter Control and Endurance.Pt. is a 73 yo Right-handed male of unknown race.- Rehab
 Goal

Patient has realistic goal of being discharged at assistance level 7-Ind to reside at Home with  Pt s
elf.

MDM/PLAN:



- Physical Therapy

 Edema - to improve, our physical therapists will perform initial evaluation of pt's status upon admi
ssion and devise an individualized program for Elevation Training, and Lymphedema Therapy

 Gait dysfunction - to improve, our physical therapists will perform initial evaluation of pt's statu
s upon admission and devise an individualized program for Gait Training, and Wheel Chair mobility

 Inability to transfer - to improve, our physical therapists will perform initial evaluation of pt's 
status upon admission and devise an individualized program for Bed mobility

 Need for home safety evaluation - to improve, our physical therapists will perform initial evaluatio
n of pt's status upon admission and devise an individualized program for Home Evaluation

 Need in caregiver upon discharge - to improve, our physical therapists will perform initial evaluati
on of pt's status upon admission and devise an individualized program for Caregiver Training

 New precaution - to improve, our physical therapists will perform initial evaluation of pt's status 
upon admission and devise an individualized program for Patient precaution education

 Poor balance - to improve, our physical therapists will perform initial evaluation of pt's status up
on admission and devise an individualized program for Balance Training

 Poor endurance - to improve, our physical therapists will perform initial evaluation of pt's status 
upon admission and devise an individualized program for Endurance Training

 Weakness - to improve, our physical therapists will perform initial evaluation of pt's status upon a
dmission and devise an individualized program for Aquatic Therapy, Neuromuscular Reeducation, and Str
engthening

- Occupational Therapy

 Need for care giver - to improve, our occupation therapists will perform initial evaluation of pt's 
status upon admission and devise an individualized program for Caregiver Training

 Weakness - to improve, our occupation therapists will perform initial evaluation of pt's status upon
 admission and devise an individualized program for Aquatic Therapy, Balance, Endurance, UE ROM, and 
UE strengthening

- Cognition - orientation

 for aphasia

- Dressing

 Status: dep

 for ADL deficits

- Grooming

 Status: min

 for ADL deficits

- Toilet Transfer

 Status: dep

 for ADL deficits

- Bed to Chair Transfer squat pivot transfer

 Status: dep

 for ADL deficits

- Tub Transfer

 Status: dep

 for ADL deficits

- Shower Transfer

 Status: dep

 for ADL deficits

- Wheel Chair to Bed Transfer

 Status: dep

 for ADL deficits

- Other

 See attached MAR (Medication Administration Record)

- Diet Type

Continue Regular

- Diet - Liquid Texture

Continue Regular

- Tube Feed

Continue N/A

- Bladder

 care per protocol

- Weight Bearing Precaution

 WBAT right LE

- Skin

 care per protocol

- Diet - Solid Texture

Continue Regular

- Shower

 allowing shower



 for Dementia, TBI, Stroke, or others

- Balance

 for Weakness

- Bed mobility

 for ADL deficits

- Eating

 for ADL deficits

- Hygiene

 for ADL deficits



FUNCTIONAL STATUS: UPDATED AT WEEKLY TEAM CONFERENCE



- Bladder

Same accident frequency: 7-Ind - No accidents in the past 7 days

- Bowel

Same accident frequency: 7-Ind - No accidents in the past 7 days

- Walking

Same score based on distance walked: 0(N/A)

- Wheelchair

Same score based on distance traveled: 0(N/A)



FUNCTIONAL STATUS:



- Self-Care

A. Eating    Jose   

B. Grooming    sup   

C. Bathing    maxA   

D. Dressing - Upper     modA   

E. Dressing - Lower     maxA   

F. Toileting    maxA   

- Sphincter Control

G. Bladder control     Ross   

H. Bowel control     Ross   

- Transfers Control

I. Bed/Chair/Wheelchair     maxA   

J. Toilet    maxA   

K. Tub/Shower    maxA   

- Locomotion

L. Walk/Wheelchair (B)     maxA   

M. Stairs    ADNO   

- Communication

N. Comprehension (B)     sup   

O. Expression (B)     sup   

- Social Cognition

P. Social Interaction    Jose   

Q. Problem Solving    sup   

R. Memory    sup   

- Endurance

Poor

- Balance

Poor

- Safety Awareness

Poor



QI SCORES:



- Self-Care

A. Eating  03-Partial/moderate assistance  

B. Oral hygiene  02-Substantial/maximal assistance  

C. Toileting hygiene  02-Substantial/maximal assistance  

E. Shower/bathe self  02-Substantial/maximal assistance  

F. Upper body dressing  02-Substantial/maximal assistance  

G. Lower body dressing  02-Substantial/maximal assistance  

H. Putting on/taking off footwear  88-Not attempted due to medical condition or safety concerns  

- Mobility

M. 1 step (curb)  88-Not attempted due to medical condition or safety concerns  

N. 4 steps  88-Not attempted due to medical condition or safety concerns  

O. 12 steps  88-Not attempted due to medical condition or safety concerns  

P. Picking up object  88-Not attempted due to medical condition or safety concerns  

R. Wheel 50 feet with two turns  88-Not attempted due to medical condition or safety concerns  

A. Roll left and right  03-Partial/moderate assistance  

B. Sit to lying  03-Partial/moderate assistance  

C. Lying to sitting on side of bed  03-Partial/moderate assistance  

D. Sit to stand  02-Substantial/maximal assistance  

E. Chair/bed-to-chair transfer  02-Substantial/maximal assistance  

F. Toilet transfer  88-Not attempted due to medical condition or safety concerns  

G. Car transfer  88-Not attempted due to medical condition or safety concerns  

I. Walk 10 feet  88-Not attempted due to medical condition or safety concerns  

J. Walk 50 feet with two turns  88-Not attempted due to medical condition or safety concerns  

K. Walk 150 feet  88-Not attempted due to medical condition or safety concerns  

L. Walking 10 feet on uneven surfaces  88-Not attempted due to medical condition or safety concerns  


S. Wheel 150 feet  88-Not attempted due to medical condition or safety concerns  

- Bladder and Bowel

Bladder continence      

Bowel continence      

- Endurance

Fair

- Balance

Fair

- Safety Awareness

Fair



CURRENT [facility]. DEFICITS:



Endurance, Balance, Self-Care, Safety Awareness, and Mobility



SIGNATURE PANEL:



Electronically signed by Dr. Michael Salinas M.D. on 09/29/2020 at 17:11 (CDT)

## 2020-09-30 NOTE — R.PN
PROGRESS NOTES



ENCOUNTER DATE AND TIME:



09/30/2020 17:17 (CDT)



NAME



BRENDA BRADSHAW



YOB: 1945



DATE OF ADMISSION:



09/10/2020 18:41 (CDT)



Left hemispheric STROKECHIEF COMPLAINT:



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



SUBJECTIVE:



Pt denied any depression.

Pt denied any Shortness of Breath.

CBC with differential is unremarkable except Hgb of 11.5. Glucose 98 to 111. Prealbumin 27.3.

Patient states that pain is under control.

ADLs done with supervision to independence.

Ambulated 80' with moderate to moderate assistance using a rolling walker.

INR 2.41, will give coumadin 8 mg today then recheck INR in the AM.



VITAL SIGNS



Temperature: 97.8F

SBP/DBP: 111/42

Pulse: 59

Resp: 16



MEDICATION ALLERGIES:



No Known Drug Allergies (NKDA)



ENVIRONMENTAL ALLERGIES:



- Substance Allergies

None Known

- Other Allergies

None Known



NURSING:



- Shower

allowing shower

- Bladder

care per protocol

- Skin

care per protocol



PRECAUTIONS:



- Weight Bearing Precaution

WBAT right LE



ACTIVITIES



OOB only with supervision



THERAPIES:



- Occupational Therapy

Cognitive Retraining. Visual Perceptual Training. 



- Dietary and Nutrition

Adequate Nutrition. Nutritional Education. Nutritional Supplements. 



- Speech Therapy

Cognitive Training. Expressive Language Skills. Memory Strategies. Receptive Language Skills. Speech 
Intelligibility Training. 



PHYSICAL EXAM



- Gen

Alert and awake

Lying in bed

No apparent distress

Oriented to: person, time, and place

- Skin

No breakdown



No abnormalities

- Eyes

No abnormalities

- ENMT

No abnormalities

- Neck

No abnormalities

- CVS

RRR

- Chest

No abnormalities

- Abd

Soft

- GI

Non distended



Deferred

- 

No abnormalities

- Ext

Mild right lower extremity edema.

- MSK

0/5 weakness in right lower extremity, 4/5 right upper extremity strength.

- Neuro

0/5 weakness in right lower extremity, 4/5 right upper extremity strength.

- Psych

No abnormalities



ASSESSMENT:



Currently, he has deficits of Locomotion, Balance, Transfers Control, Sphincter Control, and Enduranc
e.On 08/31/2020 Pt. presented to [facility] with sudden onset of right-side weakness.On 08/31/202
0 he was admitted to [facility] with diagnosis Left hemispheric STROKE.His impairment category is
 Stroke 01 -  Right Body (Left Brain) (01.2).Pt. is now referred to [facility]
 for acute in-patient rehabilitation in order to maximize patient's functional independence in activi
ties of daily living, strength, ROM, and mobility.Pre-morbidly, Pt. was independent/mod-I in Locomoti
on, Safety Awareness, Social Cognition, Balance, Transfers Control, Self-Care, and Communication; and
 he had good Sphincter Control and Endurance.Pt. is a 73 yo Right-handed male of unknown race.- Rehab
 Goal

Patient has realistic goal of being discharged at assistance level 7-Ind to reside at Home with  Pt s
elf.

MDM/PLAN:



- Physical Therapy

 Edema - to improve, our physical therapists will perform initial evaluation of pt's status upon admi
ssion and devise an individualized program for Elevation Training, and Lymphedema Therapy

 Gait dysfunction - to improve, our physical therapists will perform initial evaluation of pt's statu
s upon admission and devise an individualized program for Gait Training, and Wheel Chair mobility

 Inability to transfer - to improve, our physical therapists will perform initial evaluation of pt's 
status upon admission and devise an individualized program for Bed mobility

 Need for home safety evaluation - to improve, our physical therapists will perform initial evaluatio
n of pt's status upon admission and devise an individualized program for Home Evaluation

 Need in caregiver upon discharge - to improve, our physical therapists will perform initial evaluati
on of pt's status upon admission and devise an individualized program for Caregiver Training

 New precaution - to improve, our physical therapists will perform initial evaluation of pt's status 
upon admission and devise an individualized program for Patient precaution education

 Poor balance - to improve, our physical therapists will perform initial evaluation of pt's status up
on admission and devise an individualized program for Balance Training

 Poor endurance - to improve, our physical therapists will perform initial evaluation of pt's status 
upon admission and devise an individualized program for Endurance Training

 Weakness - to improve, our physical therapists will perform initial evaluation of pt's status upon a
dmission and devise an individualized program for Aquatic Therapy, Neuromuscular Reeducation, and Str
engthening

- Occupational Therapy

 Need for care giver - to improve, our occupation therapists will perform initial evaluation of pt's 
status upon admission and devise an individualized program for Caregiver Training

 Weakness - to improve, our occupation therapists will perform initial evaluation of pt's status upon
 admission and devise an individualized program for Aquatic Therapy, Balance, Endurance, UE ROM, and 
UE strengthening

- Cognition - orientation

 for aphasia

- Dressing

 Status: dep

 for ADL deficits

- Grooming

 Status: min

 for ADL deficits

- Toilet Transfer

 Status: dep

 for ADL deficits

- Bed to Chair Transfer squat pivot transfer

 Status: dep

 for ADL deficits

- Tub Transfer

 Status: dep

 for ADL deficits

- Shower Transfer

 Status: dep

 for ADL deficits

- Wheel Chair to Bed Transfer

 Status: dep

 for ADL deficits

- Other

 See attached MAR (Medication Administration Record)

- Diet Type

Continue Regular

- Diet - Liquid Texture

Continue Regular

- Tube Feed

Continue N/A

- Bladder

 care per protocol

- Weight Bearing Precaution

 WBAT right LE

- Skin

 care per protocol

- Diet - Solid Texture

Continue Regular

- Shower

 allowing shower



 for Dementia, TBI, Stroke, or others

- Balance

 for Weakness

- Bed mobility

 for ADL deficits

- Eating

 for ADL deficits

- Hygiene

 for ADL deficits



FUNCTIONAL STATUS: UPDATED AT WEEKLY TEAM CONFERENCE



- Bladder

Same accident frequency: 7-Ind - No accidents in the past 7 days

- Bowel

Same accident frequency: 7-Ind - No accidents in the past 7 days

- Walking

Same score based on distance walked: 0(N/A)

- Wheelchair

Same score based on distance traveled: 0(N/A)



FUNCTIONAL STATUS:



- Self-Care

A. Eating    Jose   

B. Grooming    sup   

C. Bathing    maxA   

D. Dressing - Upper     modA   

E. Dressing - Lower     maxA   

F. Toileting    maxA   

- Sphincter Control

G. Bladder control     Ross   

H. Bowel control     Ross   

- Transfers Control

I. Bed/Chair/Wheelchair     maxA   

J. Toilet    maxA   

K. Tub/Shower    maxA   

- Locomotion

L. Walk/Wheelchair (B)     maxA   

M. Stairs    ADNO   

- Communication

N. Comprehension (B)     sup   

O. Expression (B)     sup   

- Social Cognition

P. Social Interaction    Jose   

Q. Problem Solving    sup   

R. Memory    sup   

- Endurance

Poor

- Balance

Poor

- Safety Awareness

Poor



QI SCORES:



- Self-Care

A. Eating  03-Partial/moderate assistance  

B. Oral hygiene  02-Substantial/maximal assistance  

C. Toileting hygiene  02-Substantial/maximal assistance  

E. Shower/bathe self  02-Substantial/maximal assistance  

F. Upper body dressing  02-Substantial/maximal assistance  

G. Lower body dressing  02-Substantial/maximal assistance  

H. Putting on/taking off footwear  88-Not attempted due to medical condition or safety concerns  

- Mobility

M. 1 step (curb)  88-Not attempted due to medical condition or safety concerns  

N. 4 steps  88-Not attempted due to medical condition or safety concerns  

O. 12 steps  88-Not attempted due to medical condition or safety concerns  

P. Picking up object  88-Not attempted due to medical condition or safety concerns  

R. Wheel 50 feet with two turns  88-Not attempted due to medical condition or safety concerns  

A. Roll left and right  03-Partial/moderate assistance  

B. Sit to lying  03-Partial/moderate assistance  

C. Lying to sitting on side of bed  03-Partial/moderate assistance  

D. Sit to stand  02-Substantial/maximal assistance  

E. Chair/bed-to-chair transfer  02-Substantial/maximal assistance  

F. Toilet transfer  88-Not attempted due to medical condition or safety concerns  

G. Car transfer  88-Not attempted due to medical condition or safety concerns  

I. Walk 10 feet  88-Not attempted due to medical condition or safety concerns  

J. Walk 50 feet with two turns  88-Not attempted due to medical condition or safety concerns  

K. Walk 150 feet  88-Not attempted due to medical condition or safety concerns  

L. Walking 10 feet on uneven surfaces  88-Not attempted due to medical condition or safety concerns  


S. Wheel 150 feet  88-Not attempted due to medical condition or safety concerns  

- Bladder and Bowel

Bladder continence      

Bowel continence      

- Endurance

Fair

- Balance

Fair

- Safety Awareness

Fair



CURRENT [facility]. DEFICITS:



Endurance, Balance, Self-Care, Safety Awareness, and Mobility



SIGNATURE PANEL:



Electronically signed by Dr. Michael Salinas M.D. on 09/30/2020 at 17:21 (CDT)

## 2020-10-02 NOTE — P.RH.PN
Estimated Length of Stay: 24


Expected Discharge Date: 10/03/20


Discharge Disposition Plan: Home


Family Support: Yes


Long Term Goal: Mobility, Transfers, Self Care


Vital Signs: 


                                Last Vital Signs











Temp  98.2 F   10/02/20 08:00


 


Pulse  64   10/02/20 08:00


 


Resp  16   10/02/20 08:00


 


BP  102/44 L  10/02/20 08:00


 


Pulse Ox  96   10/02/20 08:00











Laboratory: 


                             Laboratory Last Values











WBC  4.2 K/uL (4.3-10.9)  L D 10/01/20  06:02    


 


RBC  3.88 M/uL (4.33-5.43)  L  10/01/20  06:02    


 


Hgb  11.0 g/dL (13.6-17.9)  L  10/01/20  06:02    


 


Hct  33.0 % (39.6-49.0)  L  10/01/20  06:02    


 


MCV  85.0 fL ()   10/01/20  06:02    


 


MCH  28.3 pg (27.0-35.0)   10/01/20  06:02    


 


MCHC  33.3 g/dL (32.0-36.0)   10/01/20  06:02    


 


RDW  14.7 % (12.1-15.2)   10/01/20  06:02    


 


Plt Count  101 K/uL (152-406)  L  10/01/20  06:02    


 


MPV  9.7 fL (7.6-11.3)   10/01/20  06:02    


 


Neutrophils %  56.1 % (41.7-73.7)   10/01/20  06:02    


 


Lymphocytes %  30.5 % (15.3-44.8)   10/01/20  06:02    


 


Monocytes %  9.7 % (3.3-12.3)   10/01/20  06:02    


 


Eosinophils %  3.1 % (0-4.4)   10/01/20  06:02    


 


Basophils %  0.6 % (0-1.3)   10/01/20  06:02    


 


Absolute Neutrophils  2.4 K/uL (1.8-8.0)   10/01/20  06:02    


 


Absolute Lymphocytes  1.3 K/uL (0.7-4.9)   10/01/20  06:02    


 


Absolute Monocytes  0.4 K/uL (0.1-1.3)   10/01/20  06:02    


 


Absolute Eosinophils  0.1 K/uL (0-0.5)   10/01/20  06:02    


 


Absolute Basophils  0.0 K/uL (0-0.5)   10/01/20  06:02    


 


Platelet Estimate  Decr   09/11/20  06:00    


 


Giant Platelets  Present   09/11/20  06:00    


 


Morphology Comment  Not seen  (NOT SEEN)   09/11/20  06:00    


 


PT  22.6 SECONDS (9.5-12.5)  H  10/02/20  06:25    


 


INR  1.94   10/02/20  06:25    


 


Sodium  142 mmol/L (136-145)   10/01/20  06:02    


 


Potassium  4.4 mmol/L (3.5-5.1)   10/01/20  06:02    


 


Chloride  110 mmol/L ()  H  10/01/20  06:02    


 


Carbon Dioxide  28 mmol/L (21-32)   10/01/20  06:02    


 


BUN  22 mg/dL (7-18)  H  10/01/20  06:02    


 


Creatinine  1.05 mg/dL (0.55-1.3)   10/01/20  06:02    


 


Estimated GFR  69 mL/min (=/>90)  L  10/01/20  06:02    


 


Glucose  113 mg/dL ()  H  10/01/20  06:02    


 


POC Glucose  99 mg/dL ()   10/02/20  07:25    


 


Hemoglobin A1c  6.6 % (4.2-6.3)  H  09/21/20  06:22    


 


Calcium  8.3 mg/dL (8.5-10.1)  L  10/01/20  06:02    


 


Magnesium  2.1 mg/dL (1.8-2.4)   09/24/20  06:22    


 


Albumin  2.7 g/dL (3.4-5.0)  L  10/01/20  06:02    


 


Prealbumin  21.8 mg/dL (20-40)   10/01/20  06:02    


 


Urine Color  Yellow   09/10/20  22:25    


 


Urine Appearance  Clear   09/10/20  22:25    


 


Urine pH  6.0  (5.0-7.0)   09/10/20  22:25    


 


Ur Specific Gravity  >=1.030  (1.005-1.030)   09/10/20  22:25    


 


Glucose (UA)(Auto)  1+  (NEG)  H  09/10/20  22:25    


 


Urine Ketones  Negative  (NEG)   09/10/20  22:25    


 


Urine Blood  Negative  (NEG)   09/10/20  22:25    


 


Urine Nitrite  Negative  (NEG)   09/10/20  22:25    


 


Urine Bilirubin  Negative  (NEG)   09/10/20  22:25    


 


Urine Urobilinogen  1.0 mg/dL (0.2-1.0)   09/10/20  22:25    


 


Ur Leukocyte Esterase  Negative  (NEG)   09/10/20  22:25    


 


Urine RBC  <5 /HPF (NONE SEEN)   09/10/20  22:25    


 


Urine WBC  <5 /HPF (<5)   09/10/20  22:25    


 


Ur Squamous Epith Cells  5-10 /HPF (NONE SEEN)  H  09/10/20  22:25    


 


Calcium Oxalate Crystal  Many  (NONE SEEN)  H  09/10/20  22:25    


 


Urine Bacteria  <20 /HPF (NONE SEEN)   09/10/20  22:25    


 


Urine Culture Reflexed  Not needed   09/10/20  22:25    


 


Urine Total Protein  Negative  (NEG)   09/10/20  22:25    


 


SARS-CoV-2 RNA (RT-PCR)  Negative  (NEGATIVE)   09/11/20  22:00    


 


Smear Scan  Ok  (OK)   09/11/20  06:00    











Weight: 203 lb


Wound Present: No


Closed Surgical Incision Present: No


Negative Pressure Wound Therapy Present: No


Physician Update: Labs show INR of 1.94. Will increase coumadin to 9 mg daily. 

Walked 90' with minimum to moderate assistance with the walker.


Medical Issues: HX INCLUDES HTN, TOBACCO ABUSE, THROMBOCYTOPENIA.


Pain Issues: TAKING TYLENOL FOR PAIN.


Nutritional Needs: ADA 1800 DIET


Comment: Zzinc oxide being applied to NEHA groin and buttocks.


Functional Improvement: pt has demonstrated significant improvemet throughout 

the week.  pt is improving his trunk strength and stability.  During standing 

and during functional movement, pt still tends to lean laterally to the R unless

verbally cued and physically steadied.  The degree of lean is much less than 

initially.  pt is also starting to demonstrate ability to elicit voluntary 

muscle contractions of all LE muscles on the R side.  pt is demonstrating 

improvements in motor control as well.  pt will continue to demonstrate 

functional improve as long as intense rehab services are maintained.


Summary: Patient's care plan and long term goals have been reviewed and revised 

as necessary. Please see the Rehabilitation Signature page for all necessary 

signatures.

## 2020-10-02 NOTE — FAST
QUALITY INDICATORS FORM



SHIFT START DATE/TIME:



10/01/2020 07:00 (CDT)



SHIFT END DATE/TIME:



10/01/2020 19:00 (CDT)



NAME



BRENDA BRADSHAW



YOB: 1945



DATE OF ADMISSION:



09/10/2020 18:41 (CDT)



PHONE:



[phone]



AGE:



74



N#



[id number]



GENDER:



Male



ENCOUNTER PHYSICIAN:



Dr. Michael Salinas M.D.



ADMISSION DIAGNOSIS:



- Stroke 01 -  Right Body (Left Brain) (01.2)

Left hemispheric STROKE. 



EATING:



EATING - STEP 1:



Does the patient complete the activity by him/herself with no assistance (physical, verbal/nonverbal 
cueing, setup/clean-up)? No.



EATING - STEP 2:



Does the patient need only setup/clean-up assistance from one helper? Yes.



1. [id number] ADMISSION PERFORMANCE:



Setup or clean-up assistance   



CODE:



05  



ORAL HYGIENE:



ORAL HYGIENE - STEP 1:



Does the patient complete the activity by him/herself with no assistance (physical, verbal/nonverbal 
cueing, setup/clean-up)? No.



ORAL HYGIENE - STEP 2:



Does the patient need only setup/clean-up assistance from one helper? Yes.



1. [id number] ADMISSION PERFORMANCE:



Setup or clean-up assistance   



CODE:



05  



TOILETING HYGIENE:



TOILETING HYGIENE - STEP 1:



Does the patient complete the activity by him/herself with no assistance (physical, verbal/nonverbal 
cueing, setup/clean-up)? No.



TOILETING HYGIENE - STEP 2:



Does the patient need only setup/clean-up assistance from one helper? No.



TOILETING HYGIENE - STEP 3:



Does the patient need only verbal/nonverbal cueing or touching/steadying/contact guard assistance fro
m one helper? No.



TOILETING HYGIENE - STEP 4:



Does the patient need physical assistance - for example lifting or trunk support from one helper - wi
th the helper providing less than half of the effort? No.



TOILETING HYGIENE - STEP 5:



Does the patient need physical assistance - for example lifting or trunk support from one helper - wi
th the helper providing more than half of the effort? Yes.



1. [id number] ADMISSION PERFORMANCE:



Substantial/maximal assistance   



CODE:



02  



BATHING:



Not assessed/no information 



CODE:



-  



DRESSING - UPPER BODY:



DRESSING - UPPER BODY - STEP 1:



Does the patient complete the activity by him/herself with no assistance (physical, verbal/nonverbal 
cueing, setup/clean-up)? No.



DRESSING - UPPER BODY - STEP 2:



Does the patient need only setup/clean-up assistance from one helper? No.



DRESSING - UPPER BODY - STEP 3:



Does the patient need only verbal/nonverbal cueing or touching/steadying/contact guard assistance fro
m one helper? No.



DRESSING - UPPER BODY - STEP 4:



Does the patient need physical assistance - for example lifting or trunk support from one helper - wi
th the helper providing less than half of the effort? No.



DRESSING - UPPER BODY - STEP 5:



Does the patient need physical assistance - for example lifting or trunk support from one helper - wi
th the helper providing more than half of the effort? Yes.



1. [id number] ADMISSION PERFORMANCE:



Substantial/maximal assistance   



CODE:



02  



DRESSING - LOWER BODY:



DRESSING - LOWER BODY - STEP 1:



Does the patient complete the activity by him/herself with no assistance (physical, verbal/nonverbal 
cueing, setup/clean-up)? No.



DRESSING - LOWER BODY - STEP 2:



Does the patient need only setup/clean-up assistance from one helper? No.



DRESSING - LOWER BODY - STEP 3:



Does the patient need only verbal/nonverbal cueing or touching/steadying/contact guard assistance fro
m one helper? No.



DRESSING - LOWER BODY - STEP 4:



Does the patient need physical assistance - for example lifting or trunk support from one helper - wi
th the helper providing less than half of the effort? No.



DRESSING - LOWER BODY - STEP 5:



Does the patient need physical assistance - for example lifting or trunk support from one helper - wi
th the helper providing more than half of the effort? Yes.



1. [id number] ADMISSION PERFORMANCE:



Substantial/maximal assistance   



CODE:



02  



PUTTING ON/TAKING OFF FOOTWEAR:



FOOTWEAR - STEP 1:



Does the patient complete the activity by him/herself with no assistance (physical, verbal/nonverbal 
cueing, setup/clean-up)? No.



FOOTWEAR - STEP 2:



Does the patient need only setup/clean-up assistance from one helper? No.



FOOTWEAR - STEP 3:



Does the patient need only verbal/nonverbal cueing or touching/steadying/contact guard assistance fro
m one helper? No.



FOOTWEAR - STEP 4:



Does the patient need physical assistance - for example lifting or trunk support from one helper - wi
th the helper providing less than half of the effort? No.



FOOTWEAR - STEP 5:



Does the patient need physical assistance - for example lifting or trunk support from one helper - wi
th the helper providing more than half of the effort? Yes.



1. [id number] ADMISSION PERFORMANCE:



Substantial/maximal assistance   



CODE:



02  



ROLL LEFT AND RIGHT:



ROLL LEFT AND RIGHT - STEP 1:



Does the patient complete the activity by him/herself with no assistance (physical, verbal/nonverbal 
cueing, setup/clean-up)? No.



ROLL LEFT AND RIGHT - STEP 2:



Does the patient need only setup/clean-up assistance from one helper? No.



ROLL LEFT AND RIGHT - STEP 3:



Does the patient need only verbal/nonverbal cueing or touching/steadying/contact guard assistance fro
m one helper? Yes.



1. [id number] ADMISSION PERFORMANCE:



Supervision or touching assistance   



CODE:



04  



SIT TO LYING:



SIT TO LYING - STEP 1:



Does the patient complete the activity by him/herself with no assistance (physical, verbal/nonverbal 
cueing, setup/clean-up)? No.



SIT TO LYING - STEP 2:



Does the patient need only setup/clean-up assistance from one helper? No.



SIT TO LYING - STEP 3:



Does the patient need only verbal/nonverbal cueing or touching/steadying/contact guard assistance fro
m one helper? Yes.



1. [id number] ADMISSION PERFORMANCE:



Supervision or touching assistance   



CODE:



04  



LYING TO SITTING:



LYING TO SITTING ON SIDE OF BED - STEP 1:



Does the patient complete the activity by him/herself with no assistance (physical, verbal/nonverbal 
cueing, setup/clean-up)? No.



LYING TO SITTING ON SIDE OF BED - STEP 2:



Does the patient need only setup/clean-up assistance from one helper? No.



LYING TO SITTING ON SIDE OF BED - STEP 3:



Does the patient need only verbal/nonverbal cueing or touching/steadying/contact guard assistance fro
m one helper? No.



LYING TO SITTING ON SIDE OF BED - STEP 4:



Does the patient need physical assistance - for example lifting or trunk support from one helper - wi
th the helper providing less than half of the effort? Yes.



1. [id number] ADMISSION PERFORMANCE:



Partial/moderate assistance   



CODE:



03  



SIT TO STAND:



SIT TO STAND - STEP 1:



Does the patient complete the activity by him/herself with no assistance (physical, verbal/nonverbal 
cueing, setup/clean-up)? No.



SIT TO STAND - STEP 2:



Does the patient need only setup/clean-up assistance from one helper? No.



SIT TO STAND - STEP 3:



Does the patient need only verbal/nonverbal cueing or touching/steadying/contact guard assistance fro
m one helper? No.



SIT TO STAND - STEP 4:



Does the patient need physical assistance - for example lifting or trunk support from one helper - wi
th the helper providing less than half of the effort? Yes.



1. [id number] ADMISSION PERFORMANCE:



Partial/moderate assistance   



CODE:



03  



TRANSFERS: BED, CHAIR:



CHAIR/BED-TO-CHAIR TRANSFER - STEP 1:



Does the patient complete the activity by him/herself with no assistance (physical, verbal/nonverbal 
cueing, setup/clean-up)? No.



CHAIR/BED-TO-CHAIR TRANSFER - STEP 2:



Does the patient need only setup/clean-up assistance from one helper? No.



CHAIR/BED-TO-CHAIR TRANSFER - STEP 3:



Does the patient need only verbal/nonverbal cueing or touching/steadying/contact guard assistance fro
m one helper? No.



CHAIR/BED-TO-CHAIR TRANSFER - STEP 4:



Does the patient need physical assistance - for example lifting or trunk support from one helper - wi
th the helper providing less than half of the effort? Yes.



1. [id number] ADMISSION PERFORMANCE:



Partial/moderate assistance   



CODE:



03  



TRANSFER TOILET:



TOILET TRANSFER - STEP 1:



Does the patient complete the activity by him/herself with no assistance (physical, verbal/nonverbal 
cueing, setup/clean-up)? No.



TOILET TRANSFER - STEP 2:



Does the patient need only setup/clean-up assistance from one helper? No.



TOILET TRANSFER - STEP 3:



Does the patient need only verbal/nonverbal cueing or touching/steadying/contact guard assistance fro
m one helper? No.



TOILET TRANSFER - STEP 4:



Does the patient need physical assistance - for example lifting or trunk support from one helper - wi
th the helper providing less than half of the effort? Yes.



1. [id number] ADMISSION PERFORMANCE:



Partial/moderate assistance   



CODE:



03  



TRANSFERS: CAR:



Not assessed/no information 



CODE:



-  



WALK 10 FEET:



Not assessed/no information 



CODE:



-   



1 STEP (CURB):



Not assessed/no information 



CODE:



-   



PICKING UP OBJECT:



Not assessed/no information 



CODE:



-  



DOES THE PATIENT USE A WHEELCHAIR/SCOOTER?



Q1. DOES THE PATIENT USE A WHEELCHAIR/SCOOTER?:



Yes    



CODE:



1  



WHEEL 50 FEET WITH TWO TURNS:



WHEEL 50 FEET WITH TWO TURNS - STEP 1:



Does the patient complete the activity by him/herself with no assistance (physical, verbal/nonverbal 
cueing, setup/clean-up)? No.



WHEEL 50 FEET WITH TWO TURNS - STEP 2:



Does the patient need only setup/clean-up assistance from one helper? Yes.



1. [id number] ADMISSION PERFORMANCE:



Setup or clean-up assistance   



CODE:



05  



INDICATE THE TYPE OF WHEELCHAIR/SCOOTER USED:



RR1. INDICATE THE TYPE OF WHEELCHAIR/SCOOTER USED.:



Manual   



CODE:



1  



WHEEL 150 FEET:



Not assessed/no information 



CODE:



-  



INDICATE THE TYPE OF WHEELCHAIR/SCOOTER USED:



SS1. INDICATE THE TYPE OF WHEELCHAIR/SCOOTER USED.:



Manual   



CODE:



1  



BLADDER AND BOWEL:



H350. BLADDER CONTINENCE (3-DAY ASSESSMENT PERIOD):



Incontinent daily (at least once a day)   



CODE:



3  



H400. BOWEL CONTINENCE (3-DAY ASSESSMENT PERIOD):



Always continent   



CODE:



0  



SIGNATURE PANEL:



The following modified sections: 1. [id number] Admission Performance, 1. [id number] Admission Performance, 
1. [id number] Admission Performance, 1. GE8009w Admission Performance, 1. CI6923s Admission Performance,
 1. EA8800i Admission Performance, 1. [id number] Admission Performance, 1. [id number] Admission Performance
, 1. HT8798F Admission Performance, 1. [id number] Admission Performance, 1. [id number] Admission Performanc
e, 1. [id number] Admission Performance, 1. [id number] Admission Performance, Q1. Does the patient use a whe
elchair/scooter?, 1. [id number] Admission Performance, RR1. Indicate the type of wheelchair/scooter used
., Code, SS1. Indicate the type of wheelchair/scooter used., H350. Bladder Continence (3-day assessme
nt period), H400. Bowel Continence (3-day assessment period) were [electronically] signed by DEX OliviaNREGINA on Thu Oct 01 2020 11:34:21 GMT-0500 (Central Daylight Time)

## 2020-10-02 NOTE — R.PN
PROGRESS NOTES



ENCOUNTER DATE AND TIME:



10/01/2020 17:41 (CDT)



NAME



BRENDA BRADSHAW



YOB: 1945



DATE OF ADMISSION:



09/10/2020 18:41 (CDT)



Left hemispheric STROKECHIEF COMPLAINT:



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



SUBJECTIVE:



Pt denied any depression.

Pt denied any Shortness of Breath.

CBC with differential is unremarkable except Hgb of 11.5. Glucose 98 to 111. Prealbumin 27.3.

Patient states that pain is under control.

ADLs done with supervision to independence.

Ambulated 80' with moderate to moderate assistance using a rolling walker.

INR 2.41, will give coumadin 8 mg today then recheck INR in the AM.



VITAL SIGNS



Temperature: 97.5 F

SBP/DBP: 117/49

Pulse: 62

Resp: 14



MEDICATION ALLERGIES:



No Known Drug Allergies (NKDA)



ENVIRONMENTAL ALLERGIES:



- Substance Allergies

None Known

- Other Allergies

None Known



NURSING:



- Shower

allowing shower

- Bladder

care per protocol

- Skin

care per protocol



PRECAUTIONS:



- Weight Bearing Precaution

WBAT right LE



ACTIVITIES



OOB only with supervision



THERAPIES:



- Occupational Therapy

Cognitive Retraining. Visual Perceptual Training. 



- Dietary and Nutrition

Adequate Nutrition. Nutritional Education. Nutritional Supplements. 



- Speech Therapy

Cognitive Training. Expressive Language Skills. Memory Strategies. Receptive Language Skills. Speech 
Intelligibility Training. 



PHYSICAL EXAM



- Gen

Alert and awake

Lying in bed

No apparent distress

Oriented to: person, time, and place

- Skin

No breakdown



No abnormalities

- Eyes

No abnormalities

- ENMT

No abnormalities

- Neck

No abnormalities

- CVS

RRR

- Chest

No abnormalities

- Abd

Soft

- GI

Non distended



Deferred

- 

No abnormalities

- Ext

Mild right lower extremity edema.

- MSK

0/5 weakness in right lower extremity, 4/5 right upper extremity strength.

- Neuro

0/5 weakness in right lower extremity, 4/5 right upper extremity strength.

- Psych

No abnormalities



ASSESSMENT:



Currently, he has deficits of Locomotion, Balance, Transfers Control, Sphincter Control, and Enduranc
e.On 08/31/2020 Pt. presented to [facility] with sudden onset of right-side weakness.On 08/31/202
0 he was admitted to [facility] with diagnosis Left hemispheric STROKE.His impairment category is
 Stroke 01 -  Right Body (Left Brain) (01.2).Pt. is now referred to [facility]
 for acute in-patient rehabilitation in order to maximize patient's functional independence in activi
ties of daily living, strength, ROM, and mobility.Pre-morbidly, Pt. was independent/mod-I in Locomoti
on, Safety Awareness, Social Cognition, Balance, Transfers Control, Self-Care, and Communication; and
 he had good Sphincter Control and Endurance.Pt. is a 75 yo Right-handed male of unknown race.- Rehab
 Goal

Patient has realistic goal of being discharged at assistance level 7-Ind to reside at Home with  Pt s
elf.

MDM/PLAN:



- Physical Therapy

 Edema - to improve, our physical therapists will perform initial evaluation of pt's status upon admi
ssion and devise an individualized program for Elevation Training, and Lymphedema Therapy

 Gait dysfunction - to improve, our physical therapists will perform initial evaluation of pt's statu
s upon admission and devise an individualized program for Gait Training, and Wheel Chair mobility

 Inability to transfer - to improve, our physical therapists will perform initial evaluation of pt's 
status upon admission and devise an individualized program for Bed mobility

 Need for home safety evaluation - to improve, our physical therapists will perform initial evaluatio
n of pt's status upon admission and devise an individualized program for Home Evaluation

 Need in caregiver upon discharge - to improve, our physical therapists will perform initial evaluati
on of pt's status upon admission and devise an individualized program for Caregiver Training

 New precaution - to improve, our physical therapists will perform initial evaluation of pt's status 
upon admission and devise an individualized program for Patient precaution education

 Poor balance - to improve, our physical therapists will perform initial evaluation of pt's status up
on admission and devise an individualized program for Balance Training

 Poor endurance - to improve, our physical therapists will perform initial evaluation of pt's status 
upon admission and devise an individualized program for Endurance Training

 Weakness - to improve, our physical therapists will perform initial evaluation of pt's status upon a
dmission and devise an individualized program for Aquatic Therapy, Neuromuscular Reeducation, and Str
engthening

- Occupational Therapy

 Need for care giver - to improve, our occupation therapists will perform initial evaluation of pt's 
status upon admission and devise an individualized program for Caregiver Training

 Weakness - to improve, our occupation therapists will perform initial evaluation of pt's status upon
 admission and devise an individualized program for Aquatic Therapy, Balance, Endurance, UE ROM, and 
UE strengthening

- Cognition - orientation

 for aphasia

- Dressing

 Status: dep

 for ADL deficits

- Grooming

 Status: min

 for ADL deficits

- Toilet Transfer

 Status: dep

 for ADL deficits

- Bed to Chair Transfer squat pivot transfer

 Status: dep

 for ADL deficits

- Tub Transfer

 Status: dep

 for ADL deficits

- Shower Transfer

 Status: dep

 for ADL deficits

- Wheel Chair to Bed Transfer

 Status: dep

 for ADL deficits

- Other

 See attached MAR (Medication Administration Record)

- Diet Type

Continue Regular

- Diet - Liquid Texture

Continue Regular

- Tube Feed

Continue N/A

- Bladder

 care per protocol

- Weight Bearing Precaution

 WBAT right LE

- Skin

 care per protocol

- Diet - Solid Texture

Continue Regular

- Shower

 allowing shower



 for Dementia, TBI, Stroke, or others

- Balance

 for Weakness

- Bed mobility

 for ADL deficits

- Eating

 for ADL deficits

- Hygiene

 for ADL deficits



FUNCTIONAL STATUS: UPDATED AT WEEKLY TEAM CONFERENCE



- Bladder

Same accident frequency: 7-Ind - No accidents in the past 7 days

- Bowel

Same accident frequency: 7-Ind - No accidents in the past 7 days

- Walking

Same score based on distance walked: 0(N/A)

- Wheelchair

Same score based on distance traveled: 0(N/A)



FUNCTIONAL STATUS:



- Self-Care

A. Eating    Jose   

B. Grooming    sup   

C. Bathing    maxA   

D. Dressing - Upper     modA   

E. Dressing - Lower     maxA   

F. Toileting    maxA   

- Sphincter Control

G. Bladder control     Ross   

H. Bowel control     Ross   

- Transfers Control

I. Bed/Chair/Wheelchair     maxA   

J. Toilet    maxA   

K. Tub/Shower    maxA   

- Locomotion

L. Walk/Wheelchair (B)     maxA   

M. Stairs    ADNO   

- Communication

N. Comprehension (B)     sup   

O. Expression (B)     sup   

- Social Cognition

P. Social Interaction    Jose   

Q. Problem Solving    sup   

R. Memory    sup   

- Endurance

Poor

- Balance

Poor

- Safety Awareness

Poor



QI SCORES:



- Self-Care

A. Eating  03-Partial/moderate assistance  

B. Oral hygiene  02-Substantial/maximal assistance  

C. Toileting hygiene  02-Substantial/maximal assistance  

E. Shower/bathe self  02-Substantial/maximal assistance  

F. Upper body dressing  02-Substantial/maximal assistance  

G. Lower body dressing  02-Substantial/maximal assistance  

H. Putting on/taking off footwear  88-Not attempted due to medical condition or safety concerns  

- Mobility

M. 1 step (curb)  88-Not attempted due to medical condition or safety concerns  

N. 4 steps  88-Not attempted due to medical condition or safety concerns  

O. 12 steps  88-Not attempted due to medical condition or safety concerns  

P. Picking up object  88-Not attempted due to medical condition or safety concerns  

R. Wheel 50 feet with two turns  88-Not attempted due to medical condition or safety concerns  

A. Roll left and right  03-Partial/moderate assistance  

B. Sit to lying  03-Partial/moderate assistance  

C. Lying to sitting on side of bed  03-Partial/moderate assistance  

D. Sit to stand  02-Substantial/maximal assistance  

E. Chair/bed-to-chair transfer  02-Substantial/maximal assistance  

F. Toilet transfer  88-Not attempted due to medical condition or safety concerns  

G. Car transfer  88-Not attempted due to medical condition or safety concerns  

I. Walk 10 feet  88-Not attempted due to medical condition or safety concerns  

J. Walk 50 feet with two turns  88-Not attempted due to medical condition or safety concerns  

K. Walk 150 feet  88-Not attempted due to medical condition or safety concerns  

L. Walking 10 feet on uneven surfaces  88-Not attempted due to medical condition or safety concerns  


S. Wheel 150 feet  88-Not attempted due to medical condition or safety concerns  

- Bladder and Bowel

Bladder continence      

Bowel continence      

- Endurance

Fair

- Balance

Fair

- Safety Awareness

Fair



CURRENT [facility]. DEFICITS:



Endurance, Balance, Self-Care, Safety Awareness, and Mobility



SIGNATURE PANEL:



Electronically signed by Dr. Michael Salinas M.D. on 10/01/2020 at 17:43 (CDT)

## 2020-10-05 NOTE — R.PN
PROGRESS NOTES



ENCOUNTER DATE AND TIME:



10/05/2020 19:40 (CDT)



NAME



BRENDA BRADSHAW



YOB: 1945



DATE OF ADMISSION:



09/10/2020 18:41 (CDT)



Left hemispheric STROKECHIEF COMPLAINT:



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



SUBJECTIVE:



Pt denied any depression.

Pt denied any Shortness of Breath.

CBC with differential is unremarkable except Hgb of 11.0 and WBC of 4.2. Glucose 91 to 109. Prealbumi
n 27.3.

Patient states that pain is under control.

ADLs done with supervision to independence.

Ambulated 50' x 2' with moderate to minimum assistance using a rolling walker.

INR 2.41, will give coumadin 8 mg today then recheck INR in the AM.



VITAL SIGNS



Temperature: 97.5 F

SBP/DBP: 127/46

Pulse: 62

Resp: 16



MEDICATION ALLERGIES:



No Known Drug Allergies (NKDA)



ENVIRONMENTAL ALLERGIES:



- Substance Allergies

None Known

- Other Allergies

None Known



NURSING:



- Shower

allowing shower

- Bladder

care per protocol

- Skin

care per protocol



PRECAUTIONS:



- Weight Bearing Precaution

WBAT right LE



ACTIVITIES



OOB only with supervision



THERAPIES:



- Occupational Therapy

Cognitive Retraining. Visual Perceptual Training. 



- Dietary and Nutrition

Adequate Nutrition. Nutritional Education. Nutritional Supplements. 



- Speech Therapy

Cognitive Training. Expressive Language Skills. Memory Strategies. Receptive Language Skills. Speech 
Intelligibility Training. 



PHYSICAL EXAM



- Gen

Alert and awake

Lying in bed

No apparent distress

Oriented to: person, time, and place

- Skin

No breakdown



No abnormalities

- Eyes

No abnormalities

- ENMT

No abnormalities

- Neck

No abnormalities

- CVS

RRR

- Chest

No abnormalities

- Abd

Soft

- GI

Non distended



Deferred

- 

No abnormalities

- Ext

Mild right lower extremity edema.

- MSK

0/5 weakness in right lower extremity, 4/5 right upper extremity strength.

- Neuro

0/5 weakness in right lower extremity, 4/5 right upper extremity strength.

- Psych

No abnormalities



ASSESSMENT:



Currently, he has deficits of Locomotion, Balance, Transfers Control, Sphincter Control, and Enduranc
e.On 08/31/2020 Pt. presented to [facility] with sudden onset of right-side weakness.On 08/31/202
0 he was admitted to [facility] with diagnosis Left hemispheric STROKE.His impairment category is
 Stroke 01 -  Right Body (Left Brain) (01.2).Pt. is now referred to [facility]
 for acute in-patient rehabilitation in order to maximize patient's functional independence in activi
ties of daily living, strength, ROM, and mobility.Pre-morbidly, Pt. was independent/mod-I in Locomoti
on, Safety Awareness, Social Cognition, Balance, Transfers Control, Self-Care, and Communication; and
 he had good Sphincter Control and Endurance.Pt. is a 73 yo Right-handed male of unknown race.- Rehab
 Goal

Patient has realistic goal of being discharged at assistance level 7-Ind to reside at Home with  Pt s
elf.

MDM/PLAN:



- Physical Therapy

 Edema - to improve, our physical therapists will perform initial evaluation of pt's status upon admi
ssion and devise an individualized program for Elevation Training, and Lymphedema Therapy

 Gait dysfunction - to improve, our physical therapists will perform initial evaluation of pt's statu
s upon admission and devise an individualized program for Gait Training, and Wheel Chair mobility

 Inability to transfer - to improve, our physical therapists will perform initial evaluation of pt's 
status upon admission and devise an individualized program for Bed mobility

 Need for home safety evaluation - to improve, our physical therapists will perform initial evaluatio
n of pt's status upon admission and devise an individualized program for Home Evaluation

 Need in caregiver upon discharge - to improve, our physical therapists will perform initial evaluati
on of pt's status upon admission and devise an individualized program for Caregiver Training

 New precaution - to improve, our physical therapists will perform initial evaluation of pt's status 
upon admission and devise an individualized program for Patient precaution education

 Poor balance - to improve, our physical therapists will perform initial evaluation of pt's status up
on admission and devise an individualized program for Balance Training

 Poor endurance - to improve, our physical therapists will perform initial evaluation of pt's status 
upon admission and devise an individualized program for Endurance Training

 Weakness - to improve, our physical therapists will perform initial evaluation of pt's status upon a
dmission and devise an individualized program for Aquatic Therapy, Neuromuscular Reeducation, and Str
engthening

- Occupational Therapy

 Need for care giver - to improve, our occupation therapists will perform initial evaluation of pt's 
status upon admission and devise an individualized program for Caregiver Training

 Weakness - to improve, our occupation therapists will perform initial evaluation of pt's status upon
 admission and devise an individualized program for Aquatic Therapy, Balance, Endurance, UE ROM, and 
UE strengthening

- Cognition - orientation

 for aphasia

- Dressing

 Status: dep

 for ADL deficits

- Grooming

 Status: min

 for ADL deficits

- Toilet Transfer

 Status: dep

 for ADL deficits

- Bed to Chair Transfer squat pivot transfer

 Status: dep

 for ADL deficits

- Tub Transfer

 Status: dep

 for ADL deficits

- Shower Transfer

 Status: dep

 for ADL deficits

- Wheel Chair to Bed Transfer

 Status: dep

 for ADL deficits

- Other

 See attached MAR (Medication Administration Record)

- Diet Type

Continue Regular

- Diet - Liquid Texture

Continue Regular

- Tube Feed

Continue N/A

- Bladder

 care per protocol

- Weight Bearing Precaution

 WBAT right LE

- Skin

 care per protocol

- Diet - Solid Texture

Continue Regular

- Shower

 allowing shower



 for Dementia, TBI, Stroke, or others

- Balance

 for Weakness

- Bed mobility

 for ADL deficits

- Eating

 for ADL deficits

- Hygiene

 for ADL deficits



FUNCTIONAL STATUS: UPDATED AT WEEKLY TEAM CONFERENCE



- Bladder

Same accident frequency: 7-Ind - No accidents in the past 7 days

- Bowel

Same accident frequency: 7-Ind - No accidents in the past 7 days

- Walking

Same score based on distance walked: 0(N/A)

- Wheelchair

Same score based on distance traveled: 0(N/A)



FUNCTIONAL STATUS:



- Self-Care

A. Eating    Jose   

B. Grooming    sup   

C. Bathing    maxA   

D. Dressing - Upper     modA   

E. Dressing - Lower     maxA   

F. Toileting    maxA   

- Sphincter Control

G. Bladder control     Ross   

H. Bowel control     Ross   

- Transfers Control

I. Bed/Chair/Wheelchair     maxA   

J. Toilet    maxA   

K. Tub/Shower    maxA   

- Locomotion

L. Walk/Wheelchair (B)     maxA   

M. Stairs    ADNO   

- Communication

N. Comprehension (B)     sup   

O. Expression (B)     sup   

- Social Cognition

P. Social Interaction    Jose   

Q. Problem Solving    sup   

R. Memory    sup   

- Endurance

Poor

- Balance

Poor

- Safety Awareness

Poor



QI SCORES:



- Self-Care

A. Eating  03-Partial/moderate assistance  

B. Oral hygiene  02-Substantial/maximal assistance  

C. Toileting hygiene  02-Substantial/maximal assistance  

E. Shower/bathe self  02-Substantial/maximal assistance  

F. Upper body dressing  02-Substantial/maximal assistance  

G. Lower body dressing  02-Substantial/maximal assistance  

H. Putting on/taking off footwear  88-Not attempted due to medical condition or safety concerns  

- Mobility

M. 1 step (curb)  88-Not attempted due to medical condition or safety concerns  

N. 4 steps  88-Not attempted due to medical condition or safety concerns  

O. 12 steps  88-Not attempted due to medical condition or safety concerns  

P. Picking up object  88-Not attempted due to medical condition or safety concerns  

R. Wheel 50 feet with two turns  88-Not attempted due to medical condition or safety concerns  

A. Roll left and right  03-Partial/moderate assistance  

B. Sit to lying  03-Partial/moderate assistance  

C. Lying to sitting on side of bed  03-Partial/moderate assistance  

D. Sit to stand  02-Substantial/maximal assistance  

E. Chair/bed-to-chair transfer  02-Substantial/maximal assistance  

F. Toilet transfer  88-Not attempted due to medical condition or safety concerns  

G. Car transfer  88-Not attempted due to medical condition or safety concerns  

I. Walk 10 feet  88-Not attempted due to medical condition or safety concerns  

J. Walk 50 feet with two turns  88-Not attempted due to medical condition or safety concerns  

K. Walk 150 feet  88-Not attempted due to medical condition or safety concerns  

L. Walking 10 feet on uneven surfaces  88-Not attempted due to medical condition or safety concerns  


S. Wheel 150 feet  88-Not attempted due to medical condition or safety concerns  

- Bladder and Bowel

Bladder continence      

Bowel continence      

- Endurance

Fair

- Balance

Fair

- Safety Awareness

Fair



CURRENT [facility]. DEFICITS:



Endurance, Balance, Self-Care, Safety Awareness, and Mobility



SIGNATURE PANEL:



Electronically signed by Dr. Michael Salinas M.D. on 10/05/2020 at 19:42 (CDT)

## 2020-10-06 NOTE — R.PN
PROGRESS NOTES



ENCOUNTER DATE AND TIME:



10/06/2020 17:39 (CDT)



NAME



BRENDA BRADSHAW



YOB: 1945



DATE OF ADMISSION:



09/10/2020 18:41 (CDT)



Left hemispheric STROKECHIEF COMPLAINT:



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



SUBJECTIVE:



Pt denied any depression.

Pt denied any Shortness of Breath.

CBC with differential is unremarkable except Hgb of 11.0 and WBC of 4.2. Glucose 91 to 109. Prealbumi
n 27.3.

Patient states that pain is under control.

ADLs done with supervision to independence.

Ambulated 90' x 2' with contact guard assistance using a rolling walker.

INR 2.10, will give coumadin 9 mg daily and recheck INR daily.



VITAL SIGNS



Temperature: 97.6 F

SBP/DBP: 123/50

Pulse: 63

Resp: 16



MEDICATION ALLERGIES:



No Known Drug Allergies (NKDA)



ENVIRONMENTAL ALLERGIES:



- Substance Allergies

None Known

- Other Allergies

None Known



NURSING:



- Shower

allowing shower

- Bladder

care per protocol

- Skin

care per protocol



PRECAUTIONS:



- Weight Bearing Precaution

WBAT right LE



ACTIVITIES



OOB only with supervision



THERAPIES:



- Occupational Therapy

Cognitive Retraining. Visual Perceptual Training. 



- Dietary and Nutrition

Adequate Nutrition. Nutritional Education. Nutritional Supplements. 



- Speech Therapy

Cognitive Training. Expressive Language Skills. Memory Strategies. Receptive Language Skills. Speech 
Intelligibility Training. 



PHYSICAL EXAM



- Gen

Alert and awake

Lying in bed

No apparent distress

Oriented to: person, time, and place

- Skin

No breakdown



No abnormalities

- Eyes

No abnormalities

- ENMT

No abnormalities

- Neck

No abnormalities

- CVS

RRR

- Chest

No abnormalities

- Abd

Soft

- GI

Non distended



Deferred

- 

No abnormalities

- Ext

Mild right lower extremity edema.

- MSK

0/5 weakness in right lower extremity, 4/5 right upper extremity strength.

- Neuro

0/5 weakness in right lower extremity, 4/5 right upper extremity strength.

- Psych

No abnormalities



ASSESSMENT:



Currently, he has deficits of Locomotion, Balance, Transfers Control, Sphincter Control, and Enduranc
e.On 08/31/2020 Pt. presented to [facility] with sudden onset of right-side weakness.On 08/31/202
0 he was admitted to [facility] with diagnosis Left hemispheric STROKE.His impairment category is
 Stroke 01 -  Right Body (Left Brain) (01.2).Pt. is now referred to [facility]
 for acute in-patient rehabilitation in order to maximize patient's functional independence in activi
ties of daily living, strength, ROM, and mobility.Pre-morbidly, Pt. was independent/mod-I in Locomoti
on, Safety Awareness, Social Cognition, Balance, Transfers Control, Self-Care, and Communication; and
 he had good Sphincter Control and Endurance.Pt. is a 73 yo Right-handed male of unknown race.- Rehab
 Goal

Patient has realistic goal of being discharged at assistance level 7-Ind to reside at Home with  Pt s
elf.

MDM/PLAN:



- Physical Therapy

 Edema - to improve, our physical therapists will perform initial evaluation of pt's status upon admi
ssion and devise an individualized program for Elevation Training, and Lymphedema Therapy

 Gait dysfunction - to improve, our physical therapists will perform initial evaluation of pt's statu
s upon admission and devise an individualized program for Gait Training, and Wheel Chair mobility

 Inability to transfer - to improve, our physical therapists will perform initial evaluation of pt's 
status upon admission and devise an individualized program for Bed mobility

 Need for home safety evaluation - to improve, our physical therapists will perform initial evaluatio
n of pt's status upon admission and devise an individualized program for Home Evaluation

 Need in caregiver upon discharge - to improve, our physical therapists will perform initial evaluati
on of pt's status upon admission and devise an individualized program for Caregiver Training

 New precaution - to improve, our physical therapists will perform initial evaluation of pt's status 
upon admission and devise an individualized program for Patient precaution education

 Poor balance - to improve, our physical therapists will perform initial evaluation of pt's status up
on admission and devise an individualized program for Balance Training

 Poor endurance - to improve, our physical therapists will perform initial evaluation of pt's status 
upon admission and devise an individualized program for Endurance Training

 Weakness - to improve, our physical therapists will perform initial evaluation of pt's status upon a
dmission and devise an individualized program for Aquatic Therapy, Neuromuscular Reeducation, and Str
engthening

- Occupational Therapy

 Need for care giver - to improve, our occupation therapists will perform initial evaluation of pt's 
status upon admission and devise an individualized program for Caregiver Training

 Weakness - to improve, our occupation therapists will perform initial evaluation of pt's status upon
 admission and devise an individualized program for Aquatic Therapy, Balance, Endurance, UE ROM, and 
UE strengthening

- Cognition - orientation

 for aphasia

- Dressing

 Status: dep

 for ADL deficits

- Grooming

 Status: min

 for ADL deficits

- Toilet Transfer

 Status: dep

 for ADL deficits

- Bed to Chair Transfer squat pivot transfer

 Status: dep

 for ADL deficits

- Tub Transfer

 Status: dep

 for ADL deficits

- Shower Transfer

 Status: dep

 for ADL deficits

- Wheel Chair to Bed Transfer

 Status: dep

 for ADL deficits

- Other

 See attached MAR (Medication Administration Record)

- Diet Type

Continue Regular

- Diet - Liquid Texture

Continue Regular

- Tube Feed

Continue N/A

- Bladder

 care per protocol

- Weight Bearing Precaution

 WBAT right LE

- Skin

 care per protocol

- Diet - Solid Texture

Continue Regular

- Shower

 allowing shower



 for Dementia, TBI, Stroke, or others

- Balance

 for Weakness

- Bed mobility

 for ADL deficits

- Eating

 for ADL deficits

- Hygiene

 for ADL deficits



FUNCTIONAL STATUS: UPDATED AT WEEKLY TEAM CONFERENCE



- Bladder

Same accident frequency: 7-Ind - No accidents in the past 7 days

- Bowel

Same accident frequency: 7-Ind - No accidents in the past 7 days

- Walking

Same score based on distance walked: 0(N/A)

- Wheelchair

Same score based on distance traveled: 0(N/A)



FUNCTIONAL STATUS:



- Self-Care

A. Eating    Jose   

B. Grooming    sup   

C. Bathing    maxA   

D. Dressing - Upper     modA   

E. Dressing - Lower     maxA   

F. Toileting    maxA   

- Sphincter Control

G. Bladder control     Ross   

H. Bowel control     oRss   

- Transfers Control

I. Bed/Chair/Wheelchair     maxA   

J. Toilet    maxA   

K. Tub/Shower    maxA   

- Locomotion

L. Walk/Wheelchair (B)     maxA   

M. Stairs    ADNO   

- Communication

N. Comprehension (B)     sup   

O. Expression (B)     sup   

- Social Cognition

P. Social Interaction    Jose   

Q. Problem Solving    sup   

R. Memory    sup   

- Endurance

Poor

- Balance

Poor

- Safety Awareness

Poor



QI SCORES:



- Self-Care

A. Eating  03-Partial/moderate assistance  

B. Oral hygiene  02-Substantial/maximal assistance  

C. Toileting hygiene  02-Substantial/maximal assistance  

E. Shower/bathe self  02-Substantial/maximal assistance  

F. Upper body dressing  02-Substantial/maximal assistance  

G. Lower body dressing  02-Substantial/maximal assistance  

H. Putting on/taking off footwear  88-Not attempted due to medical condition or safety concerns  

- Mobility

M. 1 step (curb)  88-Not attempted due to medical condition or safety concerns  

N. 4 steps  88-Not attempted due to medical condition or safety concerns  

O. 12 steps  88-Not attempted due to medical condition or safety concerns  

P. Picking up object  88-Not attempted due to medical condition or safety concerns  

R. Wheel 50 feet with two turns  88-Not attempted due to medical condition or safety concerns  

A. Roll left and right  03-Partial/moderate assistance  

B. Sit to lying  03-Partial/moderate assistance  

C. Lying to sitting on side of bed  03-Partial/moderate assistance  

D. Sit to stand  02-Substantial/maximal assistance  

E. Chair/bed-to-chair transfer  02-Substantial/maximal assistance  

F. Toilet transfer  88-Not attempted due to medical condition or safety concerns  

G. Car transfer  88-Not attempted due to medical condition or safety concerns  

I. Walk 10 feet  88-Not attempted due to medical condition or safety concerns  

J. Walk 50 feet with two turns  88-Not attempted due to medical condition or safety concerns  

K. Walk 150 feet  88-Not attempted due to medical condition or safety concerns  

L. Walking 10 feet on uneven surfaces  88-Not attempted due to medical condition or safety concerns  


S. Wheel 150 feet  88-Not attempted due to medical condition or safety concerns  

- Bladder and Bowel

Bladder continence      

Bowel continence      

- Endurance

Fair

- Balance

Fair

- Safety Awareness

Fair



CURRENT [facility]. DEFICITS:



Endurance, Balance, Self-Care, Safety Awareness, and Mobility



SIGNATURE PANEL:



Electronically signed by Dr. Michael Salinas M.D. on 10/06/2020 at 17:42 (CDT)

## 2020-10-07 NOTE — R.PN
PROGRESS NOTES



ENCOUNTER DATE AND TIME:



10/07/2020 17:16 (CDT)



NAME



BRENDA BRADSHAW



YOB: 1945



DATE OF ADMISSION:



09/10/2020 18:41 (CDT)



Left hemispheric STROKECHIEF COMPLAINT:



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



SUBJECTIVE:



Pt denied any depression.

Pt denied any Shortness of Breath.

CBC with differential is unremarkable except Hgb of 11.0 and WBC of 4.2. Glucose 108 to 136. Prealbum
in 27.3.

Patient states that pain is under control.

ADLs done with supervision to independence.

Ambulated 150' and 125' with contact guard assistance using a rolling walker.

INR 2.42, will give coumadin 9 mg daily and recheck INR daily.



VITAL SIGNS



Temperature: 97.0 F

SBP/DBP: 111/40

Pulse: 60

Resp: 16



MEDICATION ALLERGIES:



No Known Drug Allergies (NKDA)



ENVIRONMENTAL ALLERGIES:



- Substance Allergies

None Known

- Other Allergies

None Known



NURSING:



- Shower

allowing shower

- Bladder

care per protocol

- Skin

care per protocol



PRECAUTIONS:



- Weight Bearing Precaution

WBAT right LE



ACTIVITIES



OOB only with supervision



THERAPIES:



- Occupational Therapy

Cognitive Retraining. Visual Perceptual Training. 



- Dietary and Nutrition

Adequate Nutrition. Nutritional Education. Nutritional Supplements. 



- Speech Therapy

Cognitive Training. Expressive Language Skills. Memory Strategies. Receptive Language Skills. Speech 
Intelligibility Training. 



PHYSICAL EXAM



- Gen

Alert and awake

Lying in bed

No apparent distress

Oriented to: person, time, and place

- Skin

No breakdown



No abnormalities

- Eyes

No abnormalities

- ENMT

No abnormalities

- Neck

No abnormalities

- CVS

RRR

- Chest

No abnormalities

- Abd

Soft

- GI

Non distended



Deferred

- 

No abnormalities

- Ext

Mild right lower extremity edema.

- MSK

0/5 weakness in right lower extremity, 4/5 right upper extremity strength.

- Neuro

0/5 weakness in right lower extremity, 4/5 right upper extremity strength.

- Psych

No abnormalities



ASSESSMENT:



Currently, he has deficits of Locomotion, Balance, Transfers Control, Sphincter Control, and Enduranc
e.On 08/31/2020 Pt. presented to [facility] with sudden onset of right-side weakness.On 08/31/202
0 he was admitted to [facility] with diagnosis Left hemispheric STROKE.His impairment category is
 Stroke 01 -  Right Body (Left Brain) (01.2).Pt. is now referred to [facility]
 for acute in-patient rehabilitation in order to maximize patient's functional independence in activi
ties of daily living, strength, ROM, and mobility.Pre-morbidly, Pt. was independent/mod-I in Locomoti
on, Safety Awareness, Social Cognition, Balance, Transfers Control, Self-Care, and Communication; and
 he had good Sphincter Control and Endurance.Pt. is a 73 yo Right-handed male of unknown race.- Rehab
 Goal

Patient has realistic goal of being discharged at assistance level 7-Ind to reside at Home with  Pt s
elf.

MDM/PLAN:



- Physical Therapy

 Edema - to improve, our physical therapists will perform initial evaluation of pt's status upon admi
ssion and devise an individualized program for Elevation Training, and Lymphedema Therapy

 Gait dysfunction - to improve, our physical therapists will perform initial evaluation of pt's statu
s upon admission and devise an individualized program for Gait Training, and Wheel Chair mobility

 Inability to transfer - to improve, our physical therapists will perform initial evaluation of pt's 
status upon admission and devise an individualized program for Bed mobility

 Need for home safety evaluation - to improve, our physical therapists will perform initial evaluatio
n of pt's status upon admission and devise an individualized program for Home Evaluation

 Need in caregiver upon discharge - to improve, our physical therapists will perform initial evaluati
on of pt's status upon admission and devise an individualized program for Caregiver Training

 New precaution - to improve, our physical therapists will perform initial evaluation of pt's status 
upon admission and devise an individualized program for Patient precaution education

 Poor balance - to improve, our physical therapists will perform initial evaluation of pt's status up
on admission and devise an individualized program for Balance Training

 Poor endurance - to improve, our physical therapists will perform initial evaluation of pt's status 
upon admission and devise an individualized program for Endurance Training

 Weakness - to improve, our physical therapists will perform initial evaluation of pt's status upon a
dmission and devise an individualized program for Aquatic Therapy, Neuromuscular Reeducation, and Str
engthening

- Occupational Therapy

 Need for care giver - to improve, our occupation therapists will perform initial evaluation of pt's 
status upon admission and devise an individualized program for Caregiver Training

 Weakness - to improve, our occupation therapists will perform initial evaluation of pt's status upon
 admission and devise an individualized program for Aquatic Therapy, Balance, Endurance, UE ROM, and 
UE strengthening

- Cognition - orientation

 for aphasia

- Dressing

 Status: dep

 for ADL deficits

- Grooming

 Status: min

 for ADL deficits

- Toilet Transfer

 Status: dep

 for ADL deficits

- Bed to Chair Transfer squat pivot transfer

 Status: dep

 for ADL deficits

- Tub Transfer

 Status: dep

 for ADL deficits

- Shower Transfer

 Status: dep

 for ADL deficits

- Wheel Chair to Bed Transfer

 Status: dep

 for ADL deficits

- Other

 See attached MAR (Medication Administration Record)

- Diet Type

Continue Regular

- Diet - Liquid Texture

Continue Regular

- Tube Feed

Continue N/A

- Bladder

 care per protocol

- Weight Bearing Precaution

 WBAT right LE

- Skin

 care per protocol

- Diet - Solid Texture

Continue Regular

- Shower

 allowing shower



 for Dementia, TBI, Stroke, or others

- Balance

 for Weakness

- Bed mobility

 for ADL deficits

- Eating

 for ADL deficits

- Hygiene

 for ADL deficits



FUNCTIONAL STATUS: UPDATED AT WEEKLY TEAM CONFERENCE



- Bladder

Same accident frequency: 7-Ind - No accidents in the past 7 days

- Bowel

Same accident frequency: 7-Ind - No accidents in the past 7 days

- Walking

Same score based on distance walked: 0(N/A)

- Wheelchair

Same score based on distance traveled: 0(N/A)



FUNCTIONAL STATUS:



- Self-Care

A. Eating    Jose   

B. Grooming    sup   

C. Bathing    maxA   

D. Dressing - Upper     modA   

E. Dressing - Lower     maxA   

F. Toileting    maxA   

- Sphincter Control

G. Bladder control     Ross   

H. Bowel control     Ross   

- Transfers Control

I. Bed/Chair/Wheelchair     maxA   

J. Toilet    maxA   

K. Tub/Shower    maxA   

- Locomotion

L. Walk/Wheelchair (B)     maxA   

M. Stairs    ADNO   

- Communication

N. Comprehension (B)     sup   

O. Expression (B)     sup   

- Social Cognition

P. Social Interaction    Jose   

Q. Problem Solving    sup   

R. Memory    sup   

- Endurance

Poor

- Balance

Poor

- Safety Awareness

Poor



QI SCORES:



- Self-Care

A. Eating  03-Partial/moderate assistance  

B. Oral hygiene  02-Substantial/maximal assistance  

C. Toileting hygiene  02-Substantial/maximal assistance  

E. Shower/bathe self  02-Substantial/maximal assistance  

F. Upper body dressing  02-Substantial/maximal assistance  

G. Lower body dressing  02-Substantial/maximal assistance  

H. Putting on/taking off footwear  88-Not attempted due to medical condition or safety concerns  

- Mobility

M. 1 step (curb)  88-Not attempted due to medical condition or safety concerns  

N. 4 steps  88-Not attempted due to medical condition or safety concerns  

O. 12 steps  88-Not attempted due to medical condition or safety concerns  

P. Picking up object  88-Not attempted due to medical condition or safety concerns  

R. Wheel 50 feet with two turns  88-Not attempted due to medical condition or safety concerns  

A. Roll left and right  03-Partial/moderate assistance  

B. Sit to lying  03-Partial/moderate assistance  

C. Lying to sitting on side of bed  03-Partial/moderate assistance  

D. Sit to stand  02-Substantial/maximal assistance  

E. Chair/bed-to-chair transfer  02-Substantial/maximal assistance  

F. Toilet transfer  88-Not attempted due to medical condition or safety concerns  

G. Car transfer  88-Not attempted due to medical condition or safety concerns  

I. Walk 10 feet  88-Not attempted due to medical condition or safety concerns  

J. Walk 50 feet with two turns  88-Not attempted due to medical condition or safety concerns  

K. Walk 150 feet  88-Not attempted due to medical condition or safety concerns  

L. Walking 10 feet on uneven surfaces  88-Not attempted due to medical condition or safety concerns  


S. Wheel 150 feet  88-Not attempted due to medical condition or safety concerns  

- Bladder and Bowel

Bladder continence      

Bowel continence      

- Endurance

Fair

- Balance

Fair

- Safety Awareness

Fair



CURRENT [facility]. DEFICITS:



Endurance, Balance, Self-Care, Safety Awareness, and Mobility



SIGNATURE PANEL:



Electronically signed by Dr. Michael Salinas M.D. on 10/07/2020 at 17:21 (CDT)

## 2020-10-08 NOTE — FAST
OT QI REPORT FORM



ENCOUNTER DATE AND TIME:



10/08/2020 08:00 (CDT)



NAME



BRENDA BRADSHAW



YOB: 1945



DATE OF ADMISSION:



09/10/2020 18:41 (CDT)



PHONE:



[phone]



AGE:



74



N#



[id number]



GENDER:



Male



ENCOUNTER PHYSICIAN:



Dr. Michael Salinas M.D.



ADMISSION DIAGNOSIS:



- Stroke 01 -  Right Body (Left Brain) (01.2)

Left hemispheric STROKE. 



EATING:



Not assessed/no information 



CODE:



-  



ORAL HYGIENE:



ORAL HYGIENE - STEP 1:



Does the patient complete the activity by him/herself with no assistance (physical, verbal/nonverbal 
cueing, setup/clean-up)? Yes.



1. [id number] ADMISSION PERFORMANCE:



Independent   



CODE:



06  



TOILETING HYGIENE:



Not assessed/no information 



CODE:



-  



BATHING:



SHOWER/BATHE SELF - STEP 1:



Does the patient complete the activity by him/herself with no assistance (physical, verbal/nonverbal 
cueing, setup/clean-up)? No.



SHOWER/BATHE SELF - STEP 2:



Does the patient need only setup/clean-up assistance from one helper? No.



SHOWER/BATHE SELF - STEP 3:



Does the patient need only verbal/nonverbal cueing or touching/steadying/contact guard assistance fro
m one helper? Yes.



1. [id number] ADMISSION PERFORMANCE:



Supervision or touching assistance   



CODE:



04  



DRESSING - UPPER BODY:



DRESSING - UPPER BODY - STEP 1:



Does the patient complete the activity by him/herself with no assistance (physical, verbal/nonverbal 
cueing, setup/clean-up)? No.



DRESSING - UPPER BODY - STEP 2:



Does the patient need only setup/clean-up assistance from one helper? Yes.



1. [id number] ADMISSION PERFORMANCE:



Setup or clean-up assistance   



CODE:



05  



DRESSING - LOWER BODY:



DRESSING - LOWER BODY - STEP 1:



Does the patient complete the activity by him/herself with no assistance (physical, verbal/nonverbal 
cueing, setup/clean-up)? No.



DRESSING - LOWER BODY - STEP 2:



Does the patient need only setup/clean-up assistance from one helper? No.



DRESSING - LOWER BODY - STEP 3:



Does the patient need only verbal/nonverbal cueing or touching/steadying/contact guard assistance fro
m one helper? Yes.



1. [id number] ADMISSION PERFORMANCE:



Supervision or touching assistance   



CODE:



04  



PUTTING ON/TAKING OFF FOOTWEAR:



FOOTWEAR - STEP 1:



Does the patient complete the activity by him/herself with no assistance (physical, verbal/nonverbal 
cueing, setup/clean-up)? No.



FOOTWEAR - STEP 2:



Does the patient need only setup/clean-up assistance from one helper? No.



FOOTWEAR - STEP 3:



Does the patient need only verbal/nonverbal cueing or touching/steadying/contact guard assistance fro
m one helper? Yes.



1. [id number] ADMISSION PERFORMANCE:



Supervision or touching assistance   



CODE:



04  



DOES THE PATIENT USE A WHEELCHAIR/SCOOTER?



CODE:



EXPR  



INDICATE THE TYPE OF WHEELCHAIR/SCOOTER USED:



CODE:



EXPR  



INDICATE THE TYPE OF WHEELCHAIR/SCOOTER USED:



CODE:



EXPR  



BLADDER AND BOWEL:



CODE:



EXPR  



CODE:



EXPR  



SIGNATURE PANEL:



The following modified sections: 1. [id number] Admission Performance, 1. [id number] Admission Performance, 
1. [id number] Admission Performance, 1. [id number] Admission Performance, 1. [id number] Admission Performance 
were [electronically] signed by JERE Harrison on Thu Oct 08 2020 14:41:38 GMT-0500 (Central
 Daylight Time)

## 2020-10-08 NOTE — R.PN
PROGRESS NOTES



ENCOUNTER DATE AND TIME:



10/08/2020 17:49 (CDT)



NAME



BRENDA BRADSHAW



YOB: 1945



DATE OF ADMISSION:



09/10/2020 18:41 (CDT)



Left hemispheric STROKECHIEF COMPLAINT:



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



SUBJECTIVE:



Pt denied any depression.

Pt denied any Shortness of Breath.

WBC 3.7,  Hgb of 11.2, glucose 112 to 121. Prealbumin 24.2.

Patient states that pain is under control.

ADLs done with supervision to independence.

Ambulated 100' and 80' with contact guard assistance using a rolling walker.

INR 2.39, will give coumadin 9 mg daily and recheck INR daily.



VITAL SIGNS



Temperature: 97.7 F

SBP/DBP: 119/39

Pulse: 67

Resp: 16



MEDICATION ALLERGIES:



No Known Drug Allergies (NKDA)



ENVIRONMENTAL ALLERGIES:



- Substance Allergies

None Known

- Other Allergies

None Known



NURSING:



- Shower

allowing shower

- Bladder

care per protocol

- Skin

care per protocol



PRECAUTIONS:



- Weight Bearing Precaution

WBAT right LE



ACTIVITIES



OOB only with supervision



THERAPIES:



- Occupational Therapy

Cognitive Retraining. Visual Perceptual Training. 



- Dietary and Nutrition

Adequate Nutrition. Nutritional Education. Nutritional Supplements. 



- Speech Therapy

Cognitive Training. Expressive Language Skills. Memory Strategies. Receptive Language Skills. Speech 
Intelligibility Training. 



PHYSICAL EXAM



- Gen

Alert and awake

Lying in bed

No apparent distress

Oriented to: person, time, and place

- Skin

No breakdown



No abnormalities

- Eyes

No abnormalities

- ENMT

No abnormalities

- Neck

No abnormalities

- CVS

RRR

- Chest

No abnormalities

- Abd

Soft

- GI

Non distended



Deferred

- 

No abnormalities

- Ext

Mild right lower extremity edema.

- MSK

0/5 weakness in right lower extremity, 4/5 right upper extremity strength.

- Neuro

0/5 weakness in right lower extremity, 4/5 right upper extremity strength.

- Psych

No abnormalities



ASSESSMENT:



Currently, he has deficits of Locomotion, Balance, Transfers Control, Sphincter Control, and Enduranc
e.On 08/31/2020 Pt. presented to [facility] with sudden onset of right-side weakness.On 08/31/202
0 he was admitted to [facility] with diagnosis Left hemispheric STROKE.His impairment category is
 Stroke 01 -  Right Body (Left Brain) (01.2).Pt. is now referred to [facility]
 for acute in-patient rehabilitation in order to maximize patient's functional independence in activi
ties of daily living, strength, ROM, and mobility.Pre-morbidly, Pt. was independent/mod-I in Locomoti
on, Safety Awareness, Social Cognition, Balance, Transfers Control, Self-Care, and Communication; and
 he had good Sphincter Control and Endurance.Pt. is a 75 yo Right-handed male of unknown race.- Rehab
 Goal

Patient has realistic goal of being discharged at assistance level 7-Ind to reside at Home with  Pt s
elf.

MDM/PLAN:



- Physical Therapy

 Edema - to improve, our physical therapists will perform initial evaluation of pt's status upon admi
ssion and devise an individualized program for Elevation Training, and Lymphedema Therapy

 Gait dysfunction - to improve, our physical therapists will perform initial evaluation of pt's statu
s upon admission and devise an individualized program for Gait Training, and Wheel Chair mobility

 Inability to transfer - to improve, our physical therapists will perform initial evaluation of pt's 
status upon admission and devise an individualized program for Bed mobility

 Need for home safety evaluation - to improve, our physical therapists will perform initial evaluatio
n of pt's status upon admission and devise an individualized program for Home Evaluation

 Need in caregiver upon discharge - to improve, our physical therapists will perform initial evaluati
on of pt's status upon admission and devise an individualized program for Caregiver Training

 New precaution - to improve, our physical therapists will perform initial evaluation of pt's status 
upon admission and devise an individualized program for Patient precaution education

 Poor balance - to improve, our physical therapists will perform initial evaluation of pt's status up
on admission and devise an individualized program for Balance Training

 Poor endurance - to improve, our physical therapists will perform initial evaluation of pt's status 
upon admission and devise an individualized program for Endurance Training

 Weakness - to improve, our physical therapists will perform initial evaluation of pt's status upon a
dmission and devise an individualized program for Aquatic Therapy, Neuromuscular Reeducation, and Str
engthening

- Occupational Therapy

 Need for care giver - to improve, our occupation therapists will perform initial evaluation of pt's 
status upon admission and devise an individualized program for Caregiver Training

 Weakness - to improve, our occupation therapists will perform initial evaluation of pt's status upon
 admission and devise an individualized program for Aquatic Therapy, Balance, Endurance, UE ROM, and 
UE strengthening

- Cognition - orientation

 for aphasia

- Dressing

 Status: dep

 for ADL deficits

- Grooming

 Status: min

 for ADL deficits

- Toilet Transfer

 Status: dep

 for ADL deficits

- Bed to Chair Transfer squat pivot transfer

 Status: dep

 for ADL deficits

- Tub Transfer

 Status: dep

 for ADL deficits

- Shower Transfer

 Status: dep

 for ADL deficits

- Wheel Chair to Bed Transfer

 Status: dep

 for ADL deficits

- Other

 See attached MAR (Medication Administration Record)

- Diet Type

Continue Regular

- Diet - Liquid Texture

Continue Regular

- Tube Feed

Continue N/A

- Bladder

 care per protocol

- Weight Bearing Precaution

 WBAT right LE

- Skin

 care per protocol

- Diet - Solid Texture

Continue Regular

- Shower

 allowing shower



 for Dementia, TBI, Stroke, or others

- Balance

 for Weakness

- Bed mobility

 for ADL deficits

- Eating

 for ADL deficits

- Hygiene

 for ADL deficits



FUNCTIONAL STATUS: UPDATED AT WEEKLY TEAM CONFERENCE



- Bladder

Same accident frequency: 7-Ind - No accidents in the past 7 days

- Bowel

Same accident frequency: 7-Ind - No accidents in the past 7 days

- Walking

Same score based on distance walked: 0(N/A)

- Wheelchair

Same score based on distance traveled: 0(N/A)



FUNCTIONAL STATUS:



- Self-Care

A. Eating    Jose   

B. Grooming    sup   

C. Bathing    maxA   

D. Dressing - Upper     modA   

E. Dressing - Lower     maxA   

F. Toileting    maxA   

- Sphincter Control

G. Bladder control     Ross   

H. Bowel control     Ross   

- Transfers Control

I. Bed/Chair/Wheelchair     maxA   

J. Toilet    maxA   

K. Tub/Shower    maxA   

- Locomotion

L. Walk/Wheelchair (B)     maxA   

M. Stairs    ADNO   

- Communication

N. Comprehension (B)     sup   

O. Expression (B)     sup   

- Social Cognition

P. Social Interaction    Jose   

Q. Problem Solving    sup   

R. Memory    sup   

- Endurance

Poor

- Balance

Poor

- Safety Awareness

Poor



QI SCORES:



- Self-Care

A. Eating  03-Partial/moderate assistance  

B. Oral hygiene  02-Substantial/maximal assistance  

C. Toileting hygiene  02-Substantial/maximal assistance  

E. Shower/bathe self  02-Substantial/maximal assistance  

F. Upper body dressing  02-Substantial/maximal assistance  

G. Lower body dressing  02-Substantial/maximal assistance  

H. Putting on/taking off footwear  88-Not attempted due to medical condition or safety concerns  

- Mobility

M. 1 step (curb)  88-Not attempted due to medical condition or safety concerns  

N. 4 steps  88-Not attempted due to medical condition or safety concerns  

O. 12 steps  88-Not attempted due to medical condition or safety concerns  

P. Picking up object  88-Not attempted due to medical condition or safety concerns  

R. Wheel 50 feet with two turns  88-Not attempted due to medical condition or safety concerns  

A. Roll left and right  03-Partial/moderate assistance  

B. Sit to lying  03-Partial/moderate assistance  

C. Lying to sitting on side of bed  03-Partial/moderate assistance  

D. Sit to stand  02-Substantial/maximal assistance  

E. Chair/bed-to-chair transfer  02-Substantial/maximal assistance  

F. Toilet transfer  88-Not attempted due to medical condition or safety concerns  

G. Car transfer  88-Not attempted due to medical condition or safety concerns  

I. Walk 10 feet  88-Not attempted due to medical condition or safety concerns  

J. Walk 50 feet with two turns  88-Not attempted due to medical condition or safety concerns  

K. Walk 150 feet  88-Not attempted due to medical condition or safety concerns  

L. Walking 10 feet on uneven surfaces  88-Not attempted due to medical condition or safety concerns  


S. Wheel 150 feet  88-Not attempted due to medical condition or safety concerns  

- Bladder and Bowel

Bladder continence      

Bowel continence      

- Endurance

Fair

- Balance

Fair

- Safety Awareness

Fair



CURRENT [facility]. DEFICITS:



Endurance, Balance, Self-Care, Safety Awareness, and Mobility



SIGNATURE PANEL:



Electronically signed by Dr. Michael Salinas M.D. on 10/08/2020 at 17:55 (CDT)

## 2020-10-09 NOTE — P.RH.PN
Estimated Length of Stay: 37


Expected Discharge Date: 10/16/20


Discharge Disposition Plan: Home


Family Support: Yes


Long Term Goal: Mobility, Transfers, Self Care


Vital Signs: 


                                Last Vital Signs











Temp  97.6 F   10/09/20 07:39


 


Pulse  61   10/09/20 07:39


 


Resp  16   10/09/20 07:39


 


BP  116/53 L  10/09/20 07:39


 


Pulse Ox  96   10/09/20 07:39











Laboratory: 


                             Laboratory Last Values











WBC  3.7 K/uL (4.3-10.9)  L  10/08/20  05:35    


 


RBC  3.92 M/uL (4.33-5.43)  L  10/08/20  05:35    


 


Hgb  11.2 g/dL (13.6-17.9)  L  10/08/20  05:35    


 


Hct  33.5 % (39.6-49.0)  L  10/08/20  05:35    


 


MCV  85.5 fL ()   10/08/20  05:35    


 


MCH  28.6 pg (27.0-35.0)   10/08/20  05:35    


 


MCHC  33.4 g/dL (32.0-36.0)   10/08/20  05:35    


 


RDW  15.0 % (12.1-15.2)   10/08/20  05:35    


 


Plt Count  103 K/uL (152-406)  L  10/08/20  05:35    


 


MPV  8.6 fL (7.6-11.3)  D 10/08/20  05:35    


 


Neutrophils %  56.8 % (41.7-73.7)   10/08/20  05:35    


 


Lymphocytes %  28.5 % (15.3-44.8)   10/08/20  05:35    


 


Monocytes %  10.3 % (3.3-12.3)   10/08/20  05:35    


 


Eosinophils %  4.1 % (0-4.4)   10/08/20  05:35    


 


Basophils %  0.3 % (0-1.3)   10/08/20  05:35    


 


Absolute Neutrophils  2.1 K/uL (1.8-8.0)   10/08/20  05:35    


 


Segmented Neutrophils  55 % (40-80)   10/08/20  05:35    


 


Absolute Lymphocytes  1.0 K/uL (0.7-4.9)   10/08/20  05:35    


 


Lymphocytes  30 % (15-42)   10/08/20  05:35    


 


Monocytes  10 % (0-10)   10/08/20  05:35    


 


Absolute Monocytes  0.4 K/uL (0.1-1.3)   10/08/20  05:35    


 


Eosinophils  5 % (0-3)  H  10/08/20  05:35    


 


Absolute Eosinophils  0.1 K/uL (0-0.5)   10/08/20  05:35    


 


Absolute Basophils  0.0 K/uL (0-0.5)   10/08/20  05:35    


 


Platelet Estimate  Decr   10/08/20  05:35    


 


Giant Platelets  Present   09/11/20  06:00    


 


Morphology Comment  Not seen  (NOT SEEN)   10/08/20  05:35    


 


PT  31.4 SECONDS (9.5-12.5)  H  10/09/20  07:00    


 


INR  2.71   10/09/20  07:00    


 


Sodium  143 mmol/L (136-145)   10/08/20  05:35    


 


Potassium  4.1 mmol/L (3.5-5.1)   10/08/20  05:35    


 


Chloride  110 mmol/L ()  H  10/08/20  05:35    


 


Carbon Dioxide  29 mmol/L (21-32)   10/08/20  05:35    


 


BUN  20 mg/dL (7-18)  H  10/08/20  05:35    


 


Creatinine  1.05 mg/dL (0.55-1.3)   10/08/20  05:35    


 


Estimated GFR  69 mL/min (=/>90)  L  10/08/20  05:35    


 


Glucose  121 mg/dL ()  H  10/08/20  05:35    


 


POC Glucose  120 mg/dL ()   10/09/20  07:32    


 


Hemoglobin A1c  6.6 % (4.2-6.3)  H  09/21/20  06:22    


 


Calcium  8.2 mg/dL (8.5-10.1)  L  10/08/20  05:35    


 


Magnesium  2.1 mg/dL (1.8-2.4)   09/24/20  06:22    


 


Albumin  2.6 g/dL (3.4-5.0)  L  10/08/20  05:35    


 


Prealbumin  24.2 mg/dL (20-40)   10/08/20  05:35    


 


Urine Color  Yellow   09/10/20  22:25    


 


Urine Appearance  Clear   09/10/20  22:25    


 


Urine pH  6.0  (5.0-7.0)   09/10/20  22:25    


 


Ur Specific Gravity  >=1.030  (1.005-1.030)   09/10/20  22:25    


 


Glucose (UA)(Auto)  1+  (NEG)  H  09/10/20  22:25    


 


Urine Ketones  Negative  (NEG)   09/10/20  22:25    


 


Urine Blood  Negative  (NEG)   09/10/20  22:25    


 


Urine Nitrite  Negative  (NEG)   09/10/20  22:25    


 


Urine Bilirubin  Negative  (NEG)   09/10/20  22:25    


 


Urine Urobilinogen  1.0 mg/dL (0.2-1.0)   09/10/20  22:25    


 


Ur Leukocyte Esterase  Negative  (NEG)   09/10/20  22:25    


 


Urine RBC  <5 /HPF (NONE SEEN)   09/10/20  22:25    


 


Urine WBC  <5 /HPF (<5)   09/10/20  22:25    


 


Ur Squamous Epith Cells  5-10 /HPF (NONE SEEN)  H  09/10/20  22:25    


 


Calcium Oxalate Crystal  Many  (NONE SEEN)  H  09/10/20  22:25    


 


Urine Bacteria  <20 /HPF (NONE SEEN)   09/10/20  22:25    


 


Urine Culture Reflexed  Not needed   09/10/20  22:25    


 


Urine Total Protein  Negative  (NEG)   09/10/20  22:25    


 


SARS-CoV-2 RNA (RT-PCR)  Negative  (NEGATIVE)   09/11/20  22:00    


 


Smear Scan  Ok  (OK)   09/11/20  06:00    











Weight: 212 lb 3 oz


Wound Present: No


Closed Surgical Incision Present: No


Negative Pressure Wound Therapy Present: No


Physician Update: Labs rewiewed and are stable. Walking 150' contact guard. 

Transfers are at minimum assistance. His short term memory is mildly affected. 

He has difficulty reading due to his stroke. We have asked for more time from 

insurance.


Medical Issues: HX INCLUDES HTN, TOBACCO ABUSE, THROMBOCYTOPENIA, DM.


Medication Issues: BP HAS BEEN DECREASING. LISINOPRIL HAS BEEN DC'D AND 

METOPROLOL HAS BEEN DECREASED.


Pain Issues: TAKING TYLENOL FOR PAIN.


Nutritional Needs: S/W NUTRITIONIST AND KITCHEN WILL INCREASE PORTIONS.


Comment: Zinc oxide being applied to NEHA groin and buttocks - improvement noted


Functional Improvement: Patient has met all short-term goals at this time, and 

is progressing well toward long-term goals.  Patient continues to present w/ 

good overall work ethic.


Summary: Patient's care plan and long term goals have been reviewed and revised 

as necessary. Please see the Rehabilitation Signature page for all necessary 

signatures.

## 2020-10-11 NOTE — R.PN
PROGRESS NOTES



ENCOUNTER DATE AND TIME:



10/11/2020 17:05 (CDT)



NAME



BRENDA BRADSHAW



YOB: 1945



DATE OF ADMISSION:



09/10/2020 18:41 (CDT)



Left hemispheric STROKECHIEF COMPLAINT:



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



SUBJECTIVE:



Pt denied any depression.

Pt denied any Shortness of Breath.

WBC 3.7,  Hgb of 11.2, glucose 112 to 121. Prealbumin 24.2.

Patient states that pain is under control.

ADLs done with supervision to independence.

Ambulated 250' and 80' with moderate to contact guard assistance using a rolling walker.

INR 2.66, will give coumadin 9 mg daily and recheck INR daily.



VITAL SIGNS



Temperature: 97.5 F

SBP/DBP: 120/53

Pulse: 66

Resp: 16



MEDICATION ALLERGIES:



No Known Drug Allergies (NKDA)



ENVIRONMENTAL ALLERGIES:



- Substance Allergies

None Known

- Other Allergies

None Known



NURSING:



- Shower

allowing shower

- Bladder

care per protocol

- Skin

care per protocol



PRECAUTIONS:



- Weight Bearing Precaution

WBAT right LE



ACTIVITIES



OOB only with supervision



THERAPIES:



- Occupational Therapy

Cognitive Retraining. Visual Perceptual Training. 



- Dietary and Nutrition

Adequate Nutrition. Nutritional Education. Nutritional Supplements. 



- Speech Therapy

Cognitive Training. Expressive Language Skills. Memory Strategies. Receptive Language Skills. Speech 
Intelligibility Training. 



PHYSICAL EXAM



- Gen

Alert and awake

Lying in bed

No apparent distress

Oriented to: person, time, and place

- Skin

No breakdown



No abnormalities

- Eyes

No abnormalities

- ENMT

No abnormalities

- Neck

No abnormalities

- CVS

RRR

- Chest

No abnormalities

- Abd

Soft

- GI

Non distended



Deferred

- 

No abnormalities

- Ext

Mild right lower extremity edema.

- MSK

0/5 weakness in right lower extremity, 4/5 right upper extremity strength.

- Neuro

0/5 weakness in right lower extremity, 4/5 right upper extremity strength.

- Psych

No abnormalities



ASSESSMENT:



Currently, he has deficits of Locomotion, Balance, Transfers Control, Sphincter Control, and Enduranc
e.On 08/31/2020 Pt. presented to [facility] with sudden onset of right-side weakness.On 08/31/202
0 he was admitted to [facility] with diagnosis Left hemispheric STROKE.His impairment category is
 Stroke 01 -  Right Body (Left Brain) (01.2).Pt. is now referred to [facility]
 for acute in-patient rehabilitation in order to maximize patient's functional independence in activi
ties of daily living, strength, ROM, and mobility.Pre-morbidly, Pt. was independent/mod-I in Locomoti
on, Safety Awareness, Social Cognition, Balance, Transfers Control, Self-Care, and Communication; and
 he had good Sphincter Control and Endurance.Pt. is a 73 yo Right-handed male of unknown race.- Rehab
 Goal

Patient has realistic goal of being discharged at assistance level 7-Ind to reside at Home with  Pt s
elf.

MDM/PLAN:



- Physical Therapy

 Edema - to improve, our physical therapists will perform initial evaluation of pt's status upon admi
ssion and devise an individualized program for Elevation Training, and Lymphedema Therapy

 Gait dysfunction - to improve, our physical therapists will perform initial evaluation of pt's statu
s upon admission and devise an individualized program for Gait Training, and Wheel Chair mobility

 Inability to transfer - to improve, our physical therapists will perform initial evaluation of pt's 
status upon admission and devise an individualized program for Bed mobility

 Need for home safety evaluation - to improve, our physical therapists will perform initial evaluatio
n of pt's status upon admission and devise an individualized program for Home Evaluation

 Need in caregiver upon discharge - to improve, our physical therapists will perform initial evaluati
on of pt's status upon admission and devise an individualized program for Caregiver Training

 New precaution - to improve, our physical therapists will perform initial evaluation of pt's status 
upon admission and devise an individualized program for Patient precaution education

 Poor balance - to improve, our physical therapists will perform initial evaluation of pt's status up
on admission and devise an individualized program for Balance Training

 Poor endurance - to improve, our physical therapists will perform initial evaluation of pt's status 
upon admission and devise an individualized program for Endurance Training

 Weakness - to improve, our physical therapists will perform initial evaluation of pt's status upon a
dmission and devise an individualized program for Aquatic Therapy, Neuromuscular Reeducation, and Str
engthening

- Occupational Therapy

 Need for care giver - to improve, our occupation therapists will perform initial evaluation of pt's 
status upon admission and devise an individualized program for Caregiver Training

 Weakness - to improve, our occupation therapists will perform initial evaluation of pt's status upon
 admission and devise an individualized program for Aquatic Therapy, Balance, Endurance, UE ROM, and 
UE strengthening

- Cognition - orientation

 for aphasia

- Dressing

 Status: dep

 for ADL deficits

- Grooming

 Status: min

 for ADL deficits

- Toilet Transfer

 Status: dep

 for ADL deficits

- Bed to Chair Transfer squat pivot transfer

 Status: dep

 for ADL deficits

- Tub Transfer

 Status: dep

 for ADL deficits

- Shower Transfer

 Status: dep

 for ADL deficits

- Wheel Chair to Bed Transfer

 Status: dep

 for ADL deficits

- Other

 See attached MAR (Medication Administration Record)

- Diet Type

Continue Regular

- Diet - Liquid Texture

Continue Regular

- Tube Feed

Continue N/A

- Bladder

 care per protocol

- Weight Bearing Precaution

 WBAT right LE

- Skin

 care per protocol

- Diet - Solid Texture

Continue Regular

- Shower

 allowing shower



 for Dementia, TBI, Stroke, or others

- Balance

 for Weakness

- Bed mobility

 for ADL deficits

- Eating

 for ADL deficits

- Hygiene

 for ADL deficits



FUNCTIONAL STATUS: UPDATED AT WEEKLY TEAM CONFERENCE



- Bladder

Same accident frequency: 7-Ind - No accidents in the past 7 days

- Bowel

Same accident frequency: 7-Ind - No accidents in the past 7 days

- Walking

Same score based on distance walked: 0(N/A)

- Wheelchair

Same score based on distance traveled: 0(N/A)



FUNCTIONAL STATUS:



- Self-Care

A. Eating    Jose   

B. Grooming    sup   

C. Bathing    maxA   

D. Dressing - Upper     modA   

E. Dressing - Lower     maxA   

F. Toileting    maxA   

- Sphincter Control

G. Bladder control     Ross   

H. Bowel control     Ross   

- Transfers Control

I. Bed/Chair/Wheelchair     maxA   

J. Toilet    maxA   

K. Tub/Shower    maxA   

- Locomotion

L. Walk/Wheelchair (B)     maxA   

M. Stairs    ADNO   

- Communication

N. Comprehension (B)     sup   

O. Expression (B)     sup   

- Social Cognition

P. Social Interaction    Jose   

Q. Problem Solving    sup   

R. Memory    sup   

- Endurance

Poor

- Balance

Poor

- Safety Awareness

Poor



QI SCORES:



- Self-Care

A. Eating  03-Partial/moderate assistance  

B. Oral hygiene  02-Substantial/maximal assistance  

C. Toileting hygiene  02-Substantial/maximal assistance  

E. Shower/bathe self  02-Substantial/maximal assistance  

F. Upper body dressing  02-Substantial/maximal assistance  

G. Lower body dressing  02-Substantial/maximal assistance  

H. Putting on/taking off footwear  88-Not attempted due to medical condition or safety concerns  

- Mobility

M. 1 step (curb)  88-Not attempted due to medical condition or safety concerns  

N. 4 steps  88-Not attempted due to medical condition or safety concerns  

O. 12 steps  88-Not attempted due to medical condition or safety concerns  

P. Picking up object  88-Not attempted due to medical condition or safety concerns  

R. Wheel 50 feet with two turns  88-Not attempted due to medical condition or safety concerns  

A. Roll left and right  03-Partial/moderate assistance  

B. Sit to lying  03-Partial/moderate assistance  

C. Lying to sitting on side of bed  03-Partial/moderate assistance  

D. Sit to stand  02-Substantial/maximal assistance  

E. Chair/bed-to-chair transfer  02-Substantial/maximal assistance  

F. Toilet transfer  88-Not attempted due to medical condition or safety concerns  

G. Car transfer  88-Not attempted due to medical condition or safety concerns  

I. Walk 10 feet  88-Not attempted due to medical condition or safety concerns  

J. Walk 50 feet with two turns  88-Not attempted due to medical condition or safety concerns  

K. Walk 150 feet  88-Not attempted due to medical condition or safety concerns  

L. Walking 10 feet on uneven surfaces  88-Not attempted due to medical condition or safety concerns  


S. Wheel 150 feet  88-Not attempted due to medical condition or safety concerns  

- Bladder and Bowel

Bladder continence      

Bowel continence      

- Endurance

Fair

- Balance

Fair

- Safety Awareness

Fair



CURRENT [facility]. DEFICITS:



Endurance, Balance, Self-Care, Safety Awareness, and Mobility



SIGNATURE PANEL:



Electronically signed by Dr. Michael Salinas M.D. on 10/11/2020 at 17:06 (CDT)

## 2020-10-11 NOTE — FAST
QUALITY INDICATORS FORM



SHIFT START DATE/TIME:



10/11/2020 07:00 (CDT)



SHIFT END DATE/TIME:



10/11/2020 19:00 (CDT)



NAME



BRENDA BRADSHAW



YOB: 1945



DATE OF ADMISSION:



09/10/2020 18:41 (CDT)



PHONE:



[phone]



AGE:



74



N#



[id number]



GENDER:



Male



ENCOUNTER PHYSICIAN:



Dr. Michael Salinas M.D.



ADMISSION DIAGNOSIS:



- Stroke 01 -  Right Body (Left Brain) (01.2)

Left hemispheric STROKE. 



EATING:



EATING - STEP 1:



Does the patient complete the activity by him/herself with no assistance (physical, verbal/nonverbal 
cueing, setup/clean-up)? No.



EATING - STEP 2:



Does the patient need only setup/clean-up assistance from one helper? Yes.



1. [id number] ADMISSION PERFORMANCE:



Setup or clean-up assistance   



CODE:



05  



ORAL HYGIENE:



ORAL HYGIENE - STEP 1:



Does the patient complete the activity by him/herself with no assistance (physical, verbal/nonverbal 
cueing, setup/clean-up)? No.



ORAL HYGIENE - STEP 2:



Does the patient need only setup/clean-up assistance from one helper? Yes.



1. [id number] ADMISSION PERFORMANCE:



Setup or clean-up assistance   



CODE:



05  



TOILETING HYGIENE:



TOILETING HYGIENE - STEP 1:



Does the patient complete the activity by him/herself with no assistance (physical, verbal/nonverbal 
cueing, setup/clean-up)? No.



TOILETING HYGIENE - STEP 2:



Does the patient need only setup/clean-up assistance from one helper? No.



TOILETING HYGIENE - STEP 3:



Does the patient need only verbal/nonverbal cueing or touching/steadying/contact guard assistance fro
m one helper? No.



TOILETING HYGIENE - STEP 4:



Does the patient need physical assistance - for example lifting or trunk support from one helper - wi
th the helper providing less than half of the effort? Yes.



1. [id number] ADMISSION PERFORMANCE:



Partial/moderate assistance   



CODE:



03  



BATHING:



Not assessed/no information 



CODE:



-  



DRESSING - UPPER BODY:



DRESSING - UPPER BODY - STEP 1:



Does the patient complete the activity by him/herself with no assistance (physical, verbal/nonverbal 
cueing, setup/clean-up)? No.



DRESSING - UPPER BODY - STEP 2:



Does the patient need only setup/clean-up assistance from one helper? No.



DRESSING - UPPER BODY - STEP 3:



Does the patient need only verbal/nonverbal cueing or touching/steadying/contact guard assistance fro
m one helper? Yes.



1. [id number] ADMISSION PERFORMANCE:



Supervision or touching assistance   



CODE:



04  



DRESSING - LOWER BODY:



DRESSING - LOWER BODY - STEP 1:



Does the patient complete the activity by him/herself with no assistance (physical, verbal/nonverbal 
cueing, setup/clean-up)? No.



DRESSING - LOWER BODY - STEP 2:



Does the patient need only setup/clean-up assistance from one helper? No.



DRESSING - LOWER BODY - STEP 3:



Does the patient need only verbal/nonverbal cueing or touching/steadying/contact guard assistance fro
m one helper? No.



DRESSING - LOWER BODY - STEP 4:



Does the patient need physical assistance - for example lifting or trunk support from one helper - wi
th the helper providing less than half of the effort? No.



DRESSING - LOWER BODY - STEP 5:



Does the patient need physical assistance - for example lifting or trunk support from one helper - wi
th the helper providing more than half of the effort? Yes.



1. [id number] ADMISSION PERFORMANCE:



Substantial/maximal assistance   



CODE:



02  



PUTTING ON/TAKING OFF FOOTWEAR:



FOOTWEAR - STEP 1:



Does the patient complete the activity by him/herself with no assistance (physical, verbal/nonverbal 
cueing, setup/clean-up)? No.



FOOTWEAR - STEP 2:



Does the patient need only setup/clean-up assistance from one helper? No.



FOOTWEAR - STEP 3:



Does the patient need only verbal/nonverbal cueing or touching/steadying/contact guard assistance fro
m one helper? No.



FOOTWEAR - STEP 4:



Does the patient need physical assistance - for example lifting or trunk support from one helper - wi
th the helper providing less than half of the effort? No.



FOOTWEAR - STEP 5:



Does the patient need physical assistance - for example lifting or trunk support from one helper - wi
th the helper providing more than half of the effort? Yes.



1. [id number] ADMISSION PERFORMANCE:



Substantial/maximal assistance   



CODE:



02  



ROLL LEFT AND RIGHT:



ROLL LEFT AND RIGHT - STEP 1:



Does the patient complete the activity by him/herself with no assistance (physical, verbal/nonverbal 
cueing, setup/clean-up)? No.



ROLL LEFT AND RIGHT - STEP 2:



Does the patient need only setup/clean-up assistance from one helper? No.



ROLL LEFT AND RIGHT - STEP 3:



Does the patient need only verbal/nonverbal cueing or touching/steadying/contact guard assistance fro
m one helper? No.



ROLL LEFT AND RIGHT - STEP 4:



Does the patient need physical assistance - for example lifting or trunk support from one helper - wi
th the helper providing less than half of the effort? Yes.



1. [id number] ADMISSION PERFORMANCE:



Partial/moderate assistance   



CODE:



03  



SIT TO LYING:



SIT TO LYING - STEP 1:



Does the patient complete the activity by him/herself with no assistance (physical, verbal/nonverbal 
cueing, setup/clean-up)? No.



SIT TO LYING - STEP 2:



Does the patient need only setup/clean-up assistance from one helper? No.



SIT TO LYING - STEP 3:



Does the patient need only verbal/nonverbal cueing or touching/steadying/contact guard assistance fro
m one helper? No.



SIT TO LYING - STEP 4:



Does the patient need physical assistance - for example lifting or trunk support from one helper - wi
th the helper providing less than half of the effort? Yes.



1. [id number] ADMISSION PERFORMANCE:



Partial/moderate assistance   



CODE:



03  



LYING TO SITTING:



LYING TO SITTING ON SIDE OF BED - STEP 1:



Does the patient complete the activity by him/herself with no assistance (physical, verbal/nonverbal 
cueing, setup/clean-up)? No.



LYING TO SITTING ON SIDE OF BED - STEP 2:



Does the patient need only setup/clean-up assistance from one helper? No.



LYING TO SITTING ON SIDE OF BED - STEP 3:



Does the patient need only verbal/nonverbal cueing or touching/steadying/contact guard assistance fro
m one helper? Yes.



1. [id number] ADMISSION PERFORMANCE:



Supervision or touching assistance   



CODE:



04  



SIT TO STAND:



SIT TO STAND - STEP 1:



Does the patient complete the activity by him/herself with no assistance (physical, verbal/nonverbal 
cueing, setup/clean-up)? No.



SIT TO STAND - STEP 2:



Does the patient need only setup/clean-up assistance from one helper? No.



SIT TO STAND - STEP 3:



Does the patient need only verbal/nonverbal cueing or touching/steadying/contact guard assistance fro
m one helper? No.



SIT TO STAND - STEP 4:



Does the patient need physical assistance - for example lifting or trunk support from one helper - wi
th the helper providing less than half of the effort? Yes.



1. [id number] ADMISSION PERFORMANCE:



Partial/moderate assistance   



CODE:



03  



TRANSFERS: BED, CHAIR:



CHAIR/BED-TO-CHAIR TRANSFER - STEP 1:



Does the patient complete the activity by him/herself with no assistance (physical, verbal/nonverbal 
cueing, setup/clean-up)? No.



CHAIR/BED-TO-CHAIR TRANSFER - STEP 2:



Does the patient need only setup/clean-up assistance from one helper? No.



CHAIR/BED-TO-CHAIR TRANSFER - STEP 3:



Does the patient need only verbal/nonverbal cueing or touching/steadying/contact guard assistance fro
m one helper? Yes.



1. [id number] ADMISSION PERFORMANCE:



Supervision or touching assistance   



CODE:



04  



TRANSFER TOILET:



TOILET TRANSFER - STEP 1:



Does the patient complete the activity by him/herself with no assistance (physical, verbal/nonverbal 
cueing, setup/clean-up)? No.



TOILET TRANSFER - STEP 2:



Does the patient need only setup/clean-up assistance from one helper? No.



TOILET TRANSFER - STEP 3:



Does the patient need only verbal/nonverbal cueing or touching/steadying/contact guard assistance fro
m one helper? No.



TOILET TRANSFER - STEP 4:



Does the patient need physical assistance - for example lifting or trunk support from one helper - wi
th the helper providing less than half of the effort? Yes.



1. [id number] ADMISSION PERFORMANCE:



Partial/moderate assistance   



CODE:



03  



TRANSFERS: CAR:



Not assessed/no information 



CODE:



-  



WALK 10 FEET:



Not assessed/no information 



CODE:



-   



1 STEP (CURB):



Not assessed/no information 



CODE:



-   



PICKING UP OBJECT:



Not assessed/no information 



CODE:



-  



DOES THE PATIENT USE A WHEELCHAIR/SCOOTER?



Q1. DOES THE PATIENT USE A WHEELCHAIR/SCOOTER?:



Yes    



CODE:



1  



WHEEL 50 FEET WITH TWO TURNS:



WHEEL 50 FEET WITH TWO TURNS - STEP 1:



Does the patient complete the activity by him/herself with no assistance (physical, verbal/nonverbal 
cueing, setup/clean-up)? No.



WHEEL 50 FEET WITH TWO TURNS - STEP 2:



Does the patient need only setup/clean-up assistance from one helper? Yes.



1. [id number] ADMISSION PERFORMANCE:



Setup or clean-up assistance   



CODE:



05  



INDICATE THE TYPE OF WHEELCHAIR/SCOOTER USED:



RR1. INDICATE THE TYPE OF WHEELCHAIR/SCOOTER USED.:



Manual   



CODE:



1  



WHEEL 150 FEET:



Not assessed/no information 



CODE:



-  



INDICATE THE TYPE OF WHEELCHAIR/SCOOTER USED:



SS1. INDICATE THE TYPE OF WHEELCHAIR/SCOOTER USED.:



Manual   



CODE:



1  



BLADDER AND BOWEL:



H350. BLADDER CONTINENCE (3-DAY ASSESSMENT PERIOD):



Incontinent daily (at least once a day)   



CODE:



3  



H400. BOWEL CONTINENCE (3-DAY ASSESSMENT PERIOD):



Always continent   



CODE:



0  



SIGNATURE PANEL:



The following modified sections: 1. SL6410A Admission Performance, 1. SU9274L Admission Performance, 
1. [id number] Admission Performance, 1. CO8134o Admission Performance, 1. MP2481f Admission Performance,
 1. XU4520b Admission Performance, 1. EB2550D Admission Performance, 1. RE2255S Admission Performance
, 1. GJ7268P Admission Performance, 1. KZ7649D Admission Performance, 1. GZ6831E Admission Performanc
e, 1. FC9007W Admission Performance, 1. VR8758L Admission Performance, 1. YF7836I Admission Performan
ce, Q1. Does the patient use a wheelchair/scooter?, 1. PA4602F Admission Performance, RR1. Indicate t
he type of wheelchair/scooter used., Code, SS1. Indicate the type of wheelchair/scooter used., H350. 
Bladder Continence (3-day assessment period), H400. Bowel Continence (3-day assessment period) were [
electronically] signed by Yusra Partida C.N.A. on Sun Oct 11 2020 13:38:37 GMT-0500 ([place] Daylight
 Time)

## 2020-10-12 NOTE — R.PN
PROGRESS NOTES



ENCOUNTER DATE AND TIME:



10/12/2020 17:05 (CDT)



NAME



BRENDA BRADSHAW



YOB: 1945



DATE OF ADMISSION:



09/10/2020 18:41 (CDT)



Left hemispheric STROKECHIEF COMPLAINT:



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



SUBJECTIVE:



Pt denied any depression.

Pt denied any Shortness of Breath.

WBC 3.7,  Hgb of 11.2, glucose 81 to 127. Prealbumin 24.2.

Patient states that pain is under control.

ADLs done with supervision to independence.

Ambulated 120', 100' and 130' with contact guard assistance using a rolling walker.

INR 2.47, will give coumadin 9 mg daily and recheck INR daily.



VITAL SIGNS



Temperature: 97.3 F

SBP/DBP: 127/53

Pulse: 72

Resp: 16



MEDICATION ALLERGIES:



No Known Drug Allergies (NKDA)



ENVIRONMENTAL ALLERGIES:



- Substance Allergies

None Known

- Other Allergies

None Known



NURSING:



- Shower

allowing shower

- Bladder

care per protocol

- Skin

care per protocol



PRECAUTIONS:



- Weight Bearing Precaution

WBAT right LE



ACTIVITIES



OOB only with supervision



THERAPIES:



- Occupational Therapy

Cognitive Retraining. Visual Perceptual Training. 



- Dietary and Nutrition

Adequate Nutrition. Nutritional Education. Nutritional Supplements. 



- Speech Therapy

Cognitive Training. Expressive Language Skills. Memory Strategies. Receptive Language Skills. Speech 
Intelligibility Training. 



PHYSICAL EXAM



- Gen

Alert and awake

Lying in bed

No apparent distress

Oriented to: person, time, and place

- Skin

No breakdown



No abnormalities

- Eyes

No abnormalities

- ENMT

No abnormalities

- Neck

No abnormalities

- CVS

RRR

- Chest

No abnormalities

- Abd

Soft

- GI

Non distended



Deferred

- 

No abnormalities

- Ext

Mild right lower extremity edema.

- MSK

0/5 weakness in right lower extremity, 4/5 right upper extremity strength.

- Neuro

0/5 weakness in right lower extremity, 4/5 right upper extremity strength.

- Psych

No abnormalities



ASSESSMENT:



Currently, he has deficits of Locomotion, Balance, Transfers Control, Sphincter Control, and Enduranc
e.On 08/31/2020 Pt. presented to [facility] with sudden onset of right-side weakness.On 08/31/202
0 he was admitted to [facility] with diagnosis Left hemispheric STROKE.His impairment category is
 Stroke 01 -  Right Body (Left Brain) (01.2).Pt. is now referred to [facility]
 for acute in-patient rehabilitation in order to maximize patient's functional independence in activi
ties of daily living, strength, ROM, and mobility.Pre-morbidly, Pt. was independent/mod-I in Locomoti
on, Safety Awareness, Social Cognition, Balance, Transfers Control, Self-Care, and Communication; and
 he had good Sphincter Control and Endurance.Pt. is a 75 yo Right-handed male of unknown race.- Rehab
 Goal

Patient has realistic goal of being discharged at assistance level 7-Ind to reside at Home with  Pt s
elf.

MDM/PLAN:



- Physical Therapy

 Edema - to improve, our physical therapists will perform initial evaluation of pt's status upon admi
ssion and devise an individualized program for Elevation Training, and Lymphedema Therapy

 Gait dysfunction - to improve, our physical therapists will perform initial evaluation of pt's statu
s upon admission and devise an individualized program for Gait Training, and Wheel Chair mobility

 Inability to transfer - to improve, our physical therapists will perform initial evaluation of pt's 
status upon admission and devise an individualized program for Bed mobility

 Need for home safety evaluation - to improve, our physical therapists will perform initial evaluatio
n of pt's status upon admission and devise an individualized program for Home Evaluation

 Need in caregiver upon discharge - to improve, our physical therapists will perform initial evaluati
on of pt's status upon admission and devise an individualized program for Caregiver Training

 New precaution - to improve, our physical therapists will perform initial evaluation of pt's status 
upon admission and devise an individualized program for Patient precaution education

 Poor balance - to improve, our physical therapists will perform initial evaluation of pt's status up
on admission and devise an individualized program for Balance Training

 Poor endurance - to improve, our physical therapists will perform initial evaluation of pt's status 
upon admission and devise an individualized program for Endurance Training

 Weakness - to improve, our physical therapists will perform initial evaluation of pt's status upon a
dmission and devise an individualized program for Aquatic Therapy, Neuromuscular Reeducation, and Str
engthening

- Occupational Therapy

 Need for care giver - to improve, our occupation therapists will perform initial evaluation of pt's 
status upon admission and devise an individualized program for Caregiver Training

 Weakness - to improve, our occupation therapists will perform initial evaluation of pt's status upon
 admission and devise an individualized program for Aquatic Therapy, Balance, Endurance, UE ROM, and 
UE strengthening

- Cognition - orientation

 for aphasia

- Dressing

 Status: dep

 for ADL deficits

- Grooming

 Status: min

 for ADL deficits

- Toilet Transfer

 Status: dep

 for ADL deficits

- Bed to Chair Transfer squat pivot transfer

 Status: dep

 for ADL deficits

- Tub Transfer

 Status: dep

 for ADL deficits

- Shower Transfer

 Status: dep

 for ADL deficits

- Wheel Chair to Bed Transfer

 Status: dep

 for ADL deficits

- Other

 See attached MAR (Medication Administration Record)

- Diet Type

Continue Regular

- Diet - Liquid Texture

Continue Regular

- Tube Feed

Continue N/A

- Bladder

 care per protocol

- Weight Bearing Precaution

 WBAT right LE

- Skin

 care per protocol

- Diet - Solid Texture

Continue Regular

- Shower

 allowing shower



 for Dementia, TBI, Stroke, or others

- Balance

 for Weakness

- Bed mobility

 for ADL deficits

- Eating

 for ADL deficits

- Hygiene

 for ADL deficits



FUNCTIONAL STATUS: UPDATED AT WEEKLY TEAM CONFERENCE



- Bladder

Same accident frequency: 7-Ind - No accidents in the past 7 days

- Bowel

Same accident frequency: 7-Ind - No accidents in the past 7 days

- Walking

Same score based on distance walked: 0(N/A)

- Wheelchair

Same score based on distance traveled: 0(N/A)



FUNCTIONAL STATUS:



- Self-Care

A. Eating    Jose   

B. Grooming    sup   

C. Bathing    maxA   

D. Dressing - Upper     modA   

E. Dressing - Lower     maxA   

F. Toileting    maxA   

- Sphincter Control

G. Bladder control     Ross   

H. Bowel control     Ross   

- Transfers Control

I. Bed/Chair/Wheelchair     maxA   

J. Toilet    maxA   

K. Tub/Shower    maxA   

- Locomotion

L. Walk/Wheelchair (B)     maxA   

M. Stairs    ADNO   

- Communication

N. Comprehension (B)     sup   

O. Expression (B)     sup   

- Social Cognition

P. Social Interaction    Jose   

Q. Problem Solving    sup   

R. Memory    sup   

- Endurance

Poor

- Balance

Poor

- Safety Awareness

Poor



QI SCORES:



- Self-Care

A. Eating  03-Partial/moderate assistance  

B. Oral hygiene  02-Substantial/maximal assistance  

C. Toileting hygiene  02-Substantial/maximal assistance  

E. Shower/bathe self  02-Substantial/maximal assistance  

F. Upper body dressing  02-Substantial/maximal assistance  

G. Lower body dressing  02-Substantial/maximal assistance  

H. Putting on/taking off footwear  88-Not attempted due to medical condition or safety concerns  

- Mobility

M. 1 step (curb)  88-Not attempted due to medical condition or safety concerns  

N. 4 steps  88-Not attempted due to medical condition or safety concerns  

O. 12 steps  88-Not attempted due to medical condition or safety concerns  

P. Picking up object  88-Not attempted due to medical condition or safety concerns  

R. Wheel 50 feet with two turns  88-Not attempted due to medical condition or safety concerns  

A. Roll left and right  03-Partial/moderate assistance  

B. Sit to lying  03-Partial/moderate assistance  

C. Lying to sitting on side of bed  03-Partial/moderate assistance  

D. Sit to stand  02-Substantial/maximal assistance  

E. Chair/bed-to-chair transfer  02-Substantial/maximal assistance  

F. Toilet transfer  88-Not attempted due to medical condition or safety concerns  

G. Car transfer  88-Not attempted due to medical condition or safety concerns  

I. Walk 10 feet  88-Not attempted due to medical condition or safety concerns  

J. Walk 50 feet with two turns  88-Not attempted due to medical condition or safety concerns  

K. Walk 150 feet  88-Not attempted due to medical condition or safety concerns  

L. Walking 10 feet on uneven surfaces  88-Not attempted due to medical condition or safety concerns  


S. Wheel 150 feet  88-Not attempted due to medical condition or safety concerns  

- Bladder and Bowel

Bladder continence      

Bowel continence      

- Endurance

Fair

- Balance

Fair

- Safety Awareness

Fair



CURRENT [facility]. DEFICITS:



Endurance, Balance, Self-Care, Safety Awareness, and Mobility



SIGNATURE PANEL:



Electronically signed by Dr. Michael Salinas M.D. on 10/12/2020 at 17:08 (CDT)

## 2020-10-14 NOTE — FAST
OT QI REPORT FORM



ENCOUNTER DATE AND TIME:



10/14/2020 08:00 (CDT)



NAME



BRENDA BRADSHAW



YOB: 1945



DATE OF ADMISSION:



09/10/2020 18:41 (CDT)



PHONE:



[phone]



AGE:



74



N#



[id number]



GENDER:



Male



ENCOUNTER PHYSICIAN:



Dr. Michael Salinas M.D.



ADMISSION DIAGNOSIS:



- Stroke 01 -  Right Body (Left Brain) (01.2)

Left hemispheric STROKE. 



EATING:



Not assessed/no information 



CODE:



-  



ORAL HYGIENE:



Not assessed/no information 



CODE:



-  



TOILETING HYGIENE:



TOILETING HYGIENE - STEP 1:



Does the patient complete the activity by him/herself with no assistance (physical, verbal/nonverbal 
cueing, setup/clean-up)? No.



TOILETING HYGIENE - STEP 2:



Does the patient need only setup/clean-up assistance from one helper? No.



TOILETING HYGIENE - STEP 3:



Does the patient need only verbal/nonverbal cueing or touching/steadying/contact guard assistance fro
m one helper? No.



TOILETING HYGIENE - STEP 4:



Does the patient need physical assistance - for example lifting or trunk support from one helper - wi
th the helper providing less than half of the effort? Yes.



1. [id number] ADMISSION PERFORMANCE:



Partial/moderate assistance   



CODE:



03  



BATHING:



SHOWER/BATHE SELF - STEP 1:



Does the patient complete the activity by him/herself with no assistance (physical, verbal/nonverbal 
cueing, setup/clean-up)? No.



SHOWER/BATHE SELF - STEP 2:



Does the patient need only setup/clean-up assistance from one helper? No.



SHOWER/BATHE SELF - STEP 3:



Does the patient need only verbal/nonverbal cueing or touching/steadying/contact guard assistance fro
m one helper? No.



SHOWER/BATHE SELF - STEP 4:



Does the patient need physical assistance - for example lifting or trunk support from one helper - wi
th the helper providing less than half of the effort? Yes.



1. [id number] ADMISSION PERFORMANCE:



Partial/moderate assistance   



CODE:



03  



DRESSING - UPPER BODY:



DRESSING - UPPER BODY - STEP 1:



Does the patient complete the activity by him/herself with no assistance (physical, verbal/nonverbal 
cueing, setup/clean-up)? No.



DRESSING - UPPER BODY - STEP 2:



Does the patient need only setup/clean-up assistance from one helper? Yes.



1. [id number] ADMISSION PERFORMANCE:



Setup or clean-up assistance   



CODE:



05  



DRESSING - LOWER BODY:



DRESSING - LOWER BODY - STEP 1:



Does the patient complete the activity by him/herself with no assistance (physical, verbal/nonverbal 
cueing, setup/clean-up)? No.



DRESSING - LOWER BODY - STEP 2:



Does the patient need only setup/clean-up assistance from one helper? No.



DRESSING - LOWER BODY - STEP 3:



Does the patient need only verbal/nonverbal cueing or touching/steadying/contact guard assistance fro
m one helper? No.



DRESSING - LOWER BODY - STEP 4:



Does the patient need physical assistance - for example lifting or trunk support from one helper - wi
th the helper providing less than half of the effort? Yes.



1. [id number] ADMISSION PERFORMANCE:



Partial/moderate assistance   



CODE:



03  



PUTTING ON/TAKING OFF FOOTWEAR:



FOOTWEAR - STEP 1:



Does the patient complete the activity by him/herself with no assistance (physical, verbal/nonverbal 
cueing, setup/clean-up)? No.



FOOTWEAR - STEP 2:



Does the patient need only setup/clean-up assistance from one helper? No.



FOOTWEAR - STEP 3:



Does the patient need only verbal/nonverbal cueing or touching/steadying/contact guard assistance fro
m one helper? Yes.



1. [id number] ADMISSION PERFORMANCE:



Supervision or touching assistance   



CODE:



04  



DOES THE PATIENT USE A WHEELCHAIR/SCOOTER?



CODE:



EXPR  



INDICATE THE TYPE OF WHEELCHAIR/SCOOTER USED:



CODE:



EXPR  



INDICATE THE TYPE OF WHEELCHAIR/SCOOTER USED:



CODE:



EXPR  



BLADDER AND BOWEL:



CODE:



EXPR  



CODE:



EXPR  



SIGNATURE PANEL:



The following modified sections: 1. [id number] Admission Performance, 1. BH6505v Admission Performance, 
1. FK0054k Admission Performance, 1. ZQ6745m Admission Performance, 1. DR6067q Admission Performance 
were [electronically] signed by Stefani Fine OT on Wed Oct 14 2020 16:03:38 GMT-0500 ([facility]
tra Daylight Time)

## 2020-10-14 NOTE — R.PN
PROGRESS NOTES



ENCOUNTER DATE AND TIME:



10/14/2020 19:24 (CDT)



NAME



BRENDA BRADSHAW



YOB: 1945



DATE OF ADMISSION:



09/10/2020 18:41 (CDT)



Left hemispheric STROKECHIEF COMPLAINT:



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



SUBJECTIVE:



Pt denied any depression.

Pt denied any Shortness of Breath.

WBC 3.7,  Hgb of 11.2, glucose 107 to 111. Prealbumin 24.2.

Patient states that pain is under control.

ADLs done with supervision to independence.

Ambulated 190' and 60' with contact guard to standby assistance using a rolling walker. Self-propelle
d a wheelchair 250' with independence.

INR 2.38, coumadin 9 mg daily and recheck INR daily.



VITAL SIGNS



Temperature: 97.6 F

SBP/DBP: 119/51

Pulse: 62

Resp: 16



MEDICATION ALLERGIES:



No Known Drug Allergies (NKDA)



ENVIRONMENTAL ALLERGIES:



- Substance Allergies

None Known

- Other Allergies

None Known



NURSING:



- Shower

allowing shower

- Bladder

care per protocol

- Skin

care per protocol



PRECAUTIONS:



- Weight Bearing Precaution

WBAT right LE



ACTIVITIES



OOB only with supervision



THERAPIES:



- Occupational Therapy

Cognitive Retraining. Visual Perceptual Training. 



- Dietary and Nutrition

Adequate Nutrition. Nutritional Education. Nutritional Supplements. 



- Speech Therapy

Cognitive Training. Expressive Language Skills. Memory Strategies. Receptive Language Skills. Speech 
Intelligibility Training. 



PHYSICAL EXAM



- Gen

Alert and awake

Lying in bed

No apparent distress

Oriented to: person, time, and place

- Skin

No breakdown



No abnormalities

- Eyes

No abnormalities

- ENMT

No abnormalities

- Neck

No abnormalities

- CVS

RRR

- Chest

No abnormalities

- Abd

Soft

- GI

Non distended



Deferred

- 

No abnormalities

- Ext

Mild right lower extremity edema.

- MSK

0/5 weakness in right lower extremity, 4/5 right upper extremity strength.

- Neuro

0/5 weakness in right lower extremity, 4/5 right upper extremity strength.

- Psych

No abnormalities



ASSESSMENT:



Currently, he has deficits of Locomotion, Balance, Transfers Control, Sphincter Control, and Enduranc
e.On 08/31/2020 Pt. presented to [facility] with sudden onset of right-side weakness.On 08/31/202
0 he was admitted to [facility] with diagnosis Left hemispheric STROKE.His impairment category is
 Stroke 01 -  Right Body (Left Brain) (01.2).Pt. is now referred to [facility]
 for acute in-patient rehabilitation in order to maximize patient's functional independence in activi
ties of daily living, strength, ROM, and mobility.Pre-morbidly, Pt. was independent/mod-I in Locomoti
on, Safety Awareness, Social Cognition, Balance, Transfers Control, Self-Care, and Communication; and
 he had good Sphincter Control and Endurance.Pt. is a 73 yo Right-handed male of unknown race.- Rehab
 Goal

Patient has realistic goal of being discharged at assistance level 7-Ind to reside at Home with  Pt s
elf.

MDM/PLAN:



- Physical Therapy

 Edema - to improve, our physical therapists will perform initial evaluation of pt's status upon admi
ssion and devise an individualized program for Elevation Training, and Lymphedema Therapy

 Gait dysfunction - to improve, our physical therapists will perform initial evaluation of pt's statu
s upon admission and devise an individualized program for Gait Training, and Wheel Chair mobility

 Inability to transfer - to improve, our physical therapists will perform initial evaluation of pt's 
status upon admission and devise an individualized program for Bed mobility

 Need for home safety evaluation - to improve, our physical therapists will perform initial evaluatio
n of pt's status upon admission and devise an individualized program for Home Evaluation

 Need in caregiver upon discharge - to improve, our physical therapists will perform initial evaluati
on of pt's status upon admission and devise an individualized program for Caregiver Training

 New precaution - to improve, our physical therapists will perform initial evaluation of pt's status 
upon admission and devise an individualized program for Patient precaution education

 Poor balance - to improve, our physical therapists will perform initial evaluation of pt's status up
on admission and devise an individualized program for Balance Training

 Poor endurance - to improve, our physical therapists will perform initial evaluation of pt's status 
upon admission and devise an individualized program for Endurance Training

 Weakness - to improve, our physical therapists will perform initial evaluation of pt's status upon a
dmission and devise an individualized program for Aquatic Therapy, Neuromuscular Reeducation, and Str
engthening

- Occupational Therapy

 Need for care giver - to improve, our occupation therapists will perform initial evaluation of pt's 
status upon admission and devise an individualized program for Caregiver Training

 Weakness - to improve, our occupation therapists will perform initial evaluation of pt's status upon
 admission and devise an individualized program for Aquatic Therapy, Balance, Endurance, UE ROM, and 
UE strengthening

- Cognition - orientation

 for aphasia

- Dressing

 Status: dep

 for ADL deficits

- Grooming

 Status: min

 for ADL deficits

- Toilet Transfer

 Status: dep

 for ADL deficits

- Bed to Chair Transfer squat pivot transfer

 Status: dep

 for ADL deficits

- Tub Transfer

 Status: dep

 for ADL deficits

- Shower Transfer

 Status: dep

 for ADL deficits

- Wheel Chair to Bed Transfer

 Status: dep

 for ADL deficits

- Other

 See attached MAR (Medication Administration Record)

- Diet Type

Continue Regular

- Diet - Liquid Texture

Continue Regular

- Tube Feed

Continue N/A

- Bladder

 care per protocol

- Weight Bearing Precaution

 WBAT right LE

- Skin

 care per protocol

- Diet - Solid Texture

Continue Regular

- Shower

 allowing shower



 for Dementia, TBI, Stroke, or others

- Balance

 for Weakness

- Bed mobility

 for ADL deficits

- Eating

 for ADL deficits

- Hygiene

 for ADL deficits



FUNCTIONAL STATUS: UPDATED AT WEEKLY TEAM CONFERENCE



- Bladder

Same accident frequency: 7-Ind - No accidents in the past 7 days

- Bowel

Same accident frequency: 7-Ind - No accidents in the past 7 days

- Walking

Same score based on distance walked: 0(N/A)

- Wheelchair

Same score based on distance traveled: 0(N/A)



FUNCTIONAL STATUS:



- Self-Care

A. Eating    Jose   

B. Grooming    sup   

C. Bathing    maxA   

D. Dressing - Upper     modA   

E. Dressing - Lower     maxA   

F. Toileting    maxA   

- Sphincter Control

G. Bladder control     Ross   

H. Bowel control     Ross   

- Transfers Control

I. Bed/Chair/Wheelchair     maxA   

J. Toilet    maxA   

K. Tub/Shower    maxA   

- Locomotion

L. Walk/Wheelchair (B)     maxA   

M. Stairs    ADNO   

- Communication

N. Comprehension (B)     sup   

O. Expression (B)     sup   

- Social Cognition

P. Social Interaction    Jose   

Q. Problem Solving    sup   

R. Memory    sup   

- Endurance

Poor

- Balance

Poor

- Safety Awareness

Poor



QI SCORES:



- Self-Care

A. Eating  03-Partial/moderate assistance  

B. Oral hygiene  02-Substantial/maximal assistance  

C. Toileting hygiene  02-Substantial/maximal assistance  

E. Shower/bathe self  02-Substantial/maximal assistance  

F. Upper body dressing  02-Substantial/maximal assistance  

G. Lower body dressing  02-Substantial/maximal assistance  

H. Putting on/taking off footwear  88-Not attempted due to medical condition or safety concerns  

- Mobility

M. 1 step (curb)  88-Not attempted due to medical condition or safety concerns  

N. 4 steps  88-Not attempted due to medical condition or safety concerns  

O. 12 steps  88-Not attempted due to medical condition or safety concerns  

P. Picking up object  88-Not attempted due to medical condition or safety concerns  

R. Wheel 50 feet with two turns  88-Not attempted due to medical condition or safety concerns  

A. Roll left and right  03-Partial/moderate assistance  

B. Sit to lying  03-Partial/moderate assistance  

C. Lying to sitting on side of bed  03-Partial/moderate assistance  

D. Sit to stand  02-Substantial/maximal assistance  

E. Chair/bed-to-chair transfer  02-Substantial/maximal assistance  

F. Toilet transfer  88-Not attempted due to medical condition or safety concerns  

G. Car transfer  88-Not attempted due to medical condition or safety concerns  

I. Walk 10 feet  88-Not attempted due to medical condition or safety concerns  

J. Walk 50 feet with two turns  88-Not attempted due to medical condition or safety concerns  

K. Walk 150 feet  88-Not attempted due to medical condition or safety concerns  

L. Walking 10 feet on uneven surfaces  88-Not attempted due to medical condition or safety concerns  


S. Wheel 150 feet  88-Not attempted due to medical condition or safety concerns  

- Bladder and Bowel

Bladder continence      

Bowel continence      

- Endurance

Fair

- Balance

Fair

- Safety Awareness

Fair



CURRENT [facility]. DEFICITS:



Endurance, Balance, Self-Care, Safety Awareness, and Mobility



SIGNATURE PANEL:



Electronically signed by Dr. Michael Salinas M.D. on 10/14/2020 at 19:27 (CDT)

## 2020-10-15 NOTE — R.PN
PROGRESS NOTES



ENCOUNTER DATE AND TIME:



10/15/2020 17:25 (CDT)



NAME



BRENDA BRADSHAW



YOB: 1945



DATE OF ADMISSION:



09/10/2020 18:41 (CDT)



Left hemispheric STROKECHIEF COMPLAINT:



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



SUBJECTIVE:



Pt denied any depression.

Pt denied any Shortness of Breath.

WBC 4.3,  Hgb of 11.1, glucose 92 to 116. Prealbumin 17.8

Patient states that pain is under control.

ADLs done with supervision to independence.

Ambulated 150' and 60' with contact guard to standby assistance using a rolling walker. Self-propelle
d a wheelchair 250' with independence.

INR 2.33, coumadin 9 mg daily and recheck INR daily.



VITAL SIGNS



Temperature: 98.46 F

SBP/DBP: 124/44

Pulse: 60

Resp: 16



MEDICATION ALLERGIES:



No Known Drug Allergies (NKDA)



ENVIRONMENTAL ALLERGIES:



- Substance Allergies

None Known

- Other Allergies

None Known



NURSING:



- Shower

allowing shower

- Bladder

care per protocol

- Skin

care per protocol



PRECAUTIONS:



- Weight Bearing Precaution

WBAT right LE



ACTIVITIES



OOB only with supervision



THERAPIES:



- Occupational Therapy

Cognitive Retraining. Visual Perceptual Training. 



- Dietary and Nutrition

Adequate Nutrition. Nutritional Education. Nutritional Supplements. 



- Speech Therapy

Cognitive Training. Expressive Language Skills. Memory Strategies. Receptive Language Skills. Speech 
Intelligibility Training. 



PHYSICAL EXAM



- Gen

Alert and awake

Lying in bed

No apparent distress

Oriented to: person, time, and place

- Skin

No breakdown



No abnormalities

- Eyes

No abnormalities

- ENMT

No abnormalities

- Neck

No abnormalities

- CVS

RRR

- Chest

No abnormalities

- Abd

Soft

- GI

Non distended



Deferred

- 

No abnormalities

- Ext

Mild right lower extremity edema.

- MSK

0/5 weakness in right lower extremity, 4/5 right upper extremity strength.

- Neuro

0/5 weakness in right lower extremity, 4/5 right upper extremity strength.

- Psych

No abnormalities



ASSESSMENT:



Currently, he has deficits of Locomotion, Balance, Transfers Control, Sphincter Control, and Enduranc
e.On 08/31/2020 Pt. presented to [facility] with sudden onset of right-side weakness.On 08/31/202
0 he was admitted to [facility] with diagnosis Left hemispheric STROKE.His impairment category is
 Stroke 01 -  Right Body (Left Brain) (01.2).Pt. is now referred to [facility]
 for acute in-patient rehabilitation in order to maximize patient's functional independence in activi
ties of daily living, strength, ROM, and mobility.Pre-morbidly, Pt. was independent/mod-I in Locomoti
on, Safety Awareness, Social Cognition, Balance, Transfers Control, Self-Care, and Communication; and
 he had good Sphincter Control and Endurance.Pt. is a 75 yo Right-handed male of unknown race.- Rehab
 Goal

Patient has realistic goal of being discharged at assistance level 7-Ind to reside at Home with  Pt s
elf.

MDM/PLAN:



- Physical Therapy

 Edema - to improve, our physical therapists will perform initial evaluation of pt's status upon admi
ssion and devise an individualized program for Elevation Training, and Lymphedema Therapy

 Gait dysfunction - to improve, our physical therapists will perform initial evaluation of pt's statu
s upon admission and devise an individualized program for Gait Training, and Wheel Chair mobility

 Inability to transfer - to improve, our physical therapists will perform initial evaluation of pt's 
status upon admission and devise an individualized program for Bed mobility

 Need for home safety evaluation - to improve, our physical therapists will perform initial evaluatio
n of pt's status upon admission and devise an individualized program for Home Evaluation

 Need in caregiver upon discharge - to improve, our physical therapists will perform initial evaluati
on of pt's status upon admission and devise an individualized program for Caregiver Training

 New precaution - to improve, our physical therapists will perform initial evaluation of pt's status 
upon admission and devise an individualized program for Patient precaution education

 Poor balance - to improve, our physical therapists will perform initial evaluation of pt's status up
on admission and devise an individualized program for Balance Training

 Poor endurance - to improve, our physical therapists will perform initial evaluation of pt's status 
upon admission and devise an individualized program for Endurance Training

 Weakness - to improve, our physical therapists will perform initial evaluation of pt's status upon a
dmission and devise an individualized program for Aquatic Therapy, Neuromuscular Reeducation, and Str
engthening

- Occupational Therapy

 Need for care giver - to improve, our occupation therapists will perform initial evaluation of pt's 
status upon admission and devise an individualized program for Caregiver Training

 Weakness - to improve, our occupation therapists will perform initial evaluation of pt's status upon
 admission and devise an individualized program for Aquatic Therapy, Balance, Endurance, UE ROM, and 
UE strengthening

- Cognition - orientation

 for aphasia

- Dressing

 Status: dep

 for ADL deficits

- Grooming

 Status: min

 for ADL deficits

- Toilet Transfer

 Status: dep

 for ADL deficits

- Bed to Chair Transfer squat pivot transfer

 Status: dep

 for ADL deficits

- Tub Transfer

 Status: dep

 for ADL deficits

- Shower Transfer

 Status: dep

 for ADL deficits

- Wheel Chair to Bed Transfer

 Status: dep

 for ADL deficits

- Other

 See attached MAR (Medication Administration Record)

- Diet Type

Continue Regular

- Diet - Liquid Texture

Continue Regular

- Tube Feed

Continue N/A

- Bladder

 care per protocol

- Weight Bearing Precaution

 WBAT right LE

- Skin

 care per protocol

- Diet - Solid Texture

Continue Regular

- Shower

 allowing shower



 for Dementia, TBI, Stroke, or others

- Balance

 for Weakness

- Bed mobility

 for ADL deficits

- Eating

 for ADL deficits

- Hygiene

 for ADL deficits



FUNCTIONAL STATUS: UPDATED AT WEEKLY TEAM CONFERENCE



- Bladder

Same accident frequency: 7-Ind - No accidents in the past 7 days

- Bowel

Same accident frequency: 7-Ind - No accidents in the past 7 days

- Walking

Same score based on distance walked: 0(N/A)

- Wheelchair

Same score based on distance traveled: 0(N/A)



FUNCTIONAL STATUS:



- Self-Care

A. Eating    Jose   

B. Grooming    sup   

C. Bathing    maxA   

D. Dressing - Upper     modA   

E. Dressing - Lower     maxA   

F. Toileting    maxA   

- Sphincter Control

G. Bladder control     Ross   

H. Bowel control     Ross   

- Transfers Control

I. Bed/Chair/Wheelchair     maxA   

J. Toilet    maxA   

K. Tub/Shower    maxA   

- Locomotion

L. Walk/Wheelchair (B)     maxA   

M. Stairs    ADNO   

- Communication

N. Comprehension (B)     sup   

O. Expression (B)     sup   

- Social Cognition

P. Social Interaction    Jose   

Q. Problem Solving    sup   

R. Memory    sup   

- Endurance

Poor

- Balance

Poor

- Safety Awareness

Poor



QI SCORES:



- Self-Care

A. Eating  03-Partial/moderate assistance  

B. Oral hygiene  02-Substantial/maximal assistance  

C. Toileting hygiene  02-Substantial/maximal assistance  

E. Shower/bathe self  02-Substantial/maximal assistance  

F. Upper body dressing  02-Substantial/maximal assistance  

G. Lower body dressing  02-Substantial/maximal assistance  

H. Putting on/taking off footwear  88-Not attempted due to medical condition or safety concerns  

- Mobility

M. 1 step (curb)  88-Not attempted due to medical condition or safety concerns  

N. 4 steps  88-Not attempted due to medical condition or safety concerns  

O. 12 steps  88-Not attempted due to medical condition or safety concerns  

P. Picking up object  88-Not attempted due to medical condition or safety concerns  

R. Wheel 50 feet with two turns  88-Not attempted due to medical condition or safety concerns  

A. Roll left and right  03-Partial/moderate assistance  

B. Sit to lying  03-Partial/moderate assistance  

C. Lying to sitting on side of bed  03-Partial/moderate assistance  

D. Sit to stand  02-Substantial/maximal assistance  

E. Chair/bed-to-chair transfer  02-Substantial/maximal assistance  

F. Toilet transfer  88-Not attempted due to medical condition or safety concerns  

G. Car transfer  88-Not attempted due to medical condition or safety concerns  

I. Walk 10 feet  88-Not attempted due to medical condition or safety concerns  

J. Walk 50 feet with two turns  88-Not attempted due to medical condition or safety concerns  

K. Walk 150 feet  88-Not attempted due to medical condition or safety concerns  

L. Walking 10 feet on uneven surfaces  88-Not attempted due to medical condition or safety concerns  


S. Wheel 150 feet  88-Not attempted due to medical condition or safety concerns  

- Bladder and Bowel

Bladder continence      

Bowel continence      

- Endurance

Fair

- Balance

Fair

- Safety Awareness

Fair



CURRENT [facility]. DEFICITS:



Endurance, Balance, Self-Care, Safety Awareness, and Mobility



SIGNATURE PANEL:



Electronically signed by Dr. Michael Salinas M.D. on 10/15/2020 at 17:30 (CDT)

## 2020-10-16 NOTE — P.RH.PN
Estimated Length of Stay: 38


Expected Discharge Date: 10/17/20


Discharge Disposition Plan: Home


Family Support: Yes


Long Term Goal: Mobility, Transfers, Self Care


Vital Signs: 


                                Last Vital Signs











Temp  98.0 F   10/16/20 07:20


 


Pulse  61   10/16/20 07:20


 


Resp  16   10/16/20 07:20


 


BP  127/50 L  10/16/20 07:20


 


Pulse Ox  95   10/16/20 07:20











Laboratory: 


                             Laboratory Last Values











WBC  4.3 K/uL (4.3-10.9)  D 10/15/20  06:08    


 


RBC  3.82 M/uL (4.33-5.43)  L  10/15/20  06:08    


 


Hgb  11.1 g/dL (13.6-17.9)  L  10/15/20  06:08    


 


Hct  32.2 % (39.6-49.0)  L  10/15/20  06:08    


 


MCV  84.4 fL ()   10/15/20  06:08    


 


MCH  29.0 pg (27.0-35.0)   10/15/20  06:08    


 


MCHC  34.4 g/dL (32.0-36.0)   10/15/20  06:08    


 


RDW  15.2 % (12.1-15.2)   10/15/20  06:08    


 


Plt Count  125 K/uL (152-406)  L D 10/15/20  06:08    


 


MPV  8.9 fL (7.6-11.3)   10/15/20  06:08    


 


Neutrophils %  59.3 % (41.7-73.7)   10/15/20  06:08    


 


Lymphocytes %  27.6 % (15.3-44.8)   10/15/20  06:08    


 


Monocytes %  10.3 % (3.3-12.3)   10/15/20  06:08    


 


Eosinophils %  2.5 % (0-4.4)   10/15/20  06:08    


 


Basophils %  0.3 % (0-1.3)   10/15/20  06:08    


 


Absolute Neutrophils  2.6 K/uL (1.8-8.0)   10/15/20  06:08    


 


Segmented Neutrophils  55 % (40-80)   10/08/20  05:35    


 


Absolute Lymphocytes  1.2 K/uL (0.7-4.9)   10/15/20  06:08    


 


Lymphocytes  30 % (15-42)   10/08/20  05:35    


 


Monocytes  10 % (0-10)   10/08/20  05:35    


 


Absolute Monocytes  0.4 K/uL (0.1-1.3)   10/15/20  06:08    


 


Eosinophils  5 % (0-3)  H  10/08/20  05:35    


 


Absolute Eosinophils  0.1 K/uL (0-0.5)   10/15/20  06:08    


 


Absolute Basophils  0.0 K/uL (0-0.5)   10/15/20  06:08    


 


Platelet Estimate  Decr   10/08/20  05:35    


 


Giant Platelets  Present   09/11/20  06:00    


 


Morphology Comment  Not seen  (NOT SEEN)   10/08/20  05:35    


 


PT  22.0 SECONDS (9.5-12.5)  H  10/16/20  06:16    


 


INR  1.89   10/16/20  06:16    


 


Sodium  142 mmol/L (136-145)   10/15/20  06:08    


 


Potassium  4.2 mmol/L (3.5-5.1)   10/15/20  06:08    


 


Chloride  110 mmol/L ()  H  10/15/20  06:08    


 


Carbon Dioxide  29 mmol/L (21-32)   10/15/20  06:08    


 


BUN  19 mg/dL (7-18)  H  10/15/20  06:08    


 


Creatinine  1.09 mg/dL (0.55-1.3)   10/15/20  06:08    


 


Estimated GFR  66 mL/min (=/>90)  L  10/15/20  06:08    


 


Glucose  110 mg/dL ()  H  10/15/20  06:08    


 


POC Glucose  99 mg/dL ()   10/16/20  07:16    


 


Hemoglobin A1c  6.6 % (4.2-6.3)  H  09/21/20  06:22    


 


Calcium  8.1 mg/dL (8.5-10.1)  L  10/15/20  06:08    


 


Magnesium  2.1 mg/dL (1.8-2.4)   09/24/20  06:22    


 


Albumin  2.5 g/dL (3.4-5.0)  L  10/15/20  06:08    


 


Prealbumin  17.8 mg/dL (20-40)  L  10/15/20  06:08    


 


Urine Color  Yellow   09/10/20  22:25    


 


Urine Appearance  Clear   09/10/20  22:25    


 


Urine pH  6.0  (5.0-7.0)   09/10/20  22:25    


 


Ur Specific Gravity  >=1.030  (1.005-1.030)   09/10/20  22:25    


 


Glucose (UA)(Auto)  1+  (NEG)  H  09/10/20  22:25    


 


Urine Ketones  Negative  (NEG)   09/10/20  22:25    


 


Urine Blood  Negative  (NEG)   09/10/20  22:25    


 


Urine Nitrite  Negative  (NEG)   09/10/20  22:25    


 


Urine Bilirubin  Negative  (NEG)   09/10/20  22:25    


 


Urine Urobilinogen  1.0 mg/dL (0.2-1.0)   09/10/20  22:25    


 


Ur Leukocyte Esterase  Negative  (NEG)   09/10/20  22:25    


 


Urine RBC  <5 /HPF (NONE SEEN)   09/10/20  22:25    


 


Urine WBC  <5 /HPF (<5)   09/10/20  22:25    


 


Ur Squamous Epith Cells  5-10 /HPF (NONE SEEN)  H  09/10/20  22:25    


 


Calcium Oxalate Crystal  Many  (NONE SEEN)  H  09/10/20  22:25    


 


Urine Bacteria  <20 /HPF (NONE SEEN)   09/10/20  22:25    


 


Urine Culture Reflexed  Not needed   09/10/20  22:25    


 


Urine Total Protein  Negative  (NEG)   09/10/20  22:25    


 


SARS-CoV-2 RNA (RT-PCR)  Negative  (NEGATIVE)   09/11/20  22:00    


 


Smear Scan  Ok  (OK)   09/11/20  06:00    











Weight: 213 lb 8 oz


Wound Present: No


Closed Surgical Incision Present: No


Negative Pressure Wound Therapy Present: No


Physician Update: INR is 1.83 today after eating more greens. He made fair 

overall progress with transfers, bed mobility and toileting at minimum 

assistance. He is walking better with minimum to moderate assistance but tends 

to lean to the right side. His right leg strength is improving slowly. He has 

some features of depression. He should followup with psychiatry. He is on prozac

20 mg daily.


Medical Issues: HX INCLUDES HTN, TOBACCO ABUSE, THROMBOCYTOPENIA, DM.


Medication Issues: NONE THIS WEEK. INR IS THERAPEUTIC.


Pain Issues: TAKING TYLENOL FOR PAIN.


Nutritional Needs: DOING WELL WITH INCREASED MEAL PORTIONS.


Comment: Zinc oxide being applied to NEHA groin and buttocks - improvement noted


Functional Improvement: Patient has met all short-term and long-term goals at 

this time, w/ the exception of a car transfer.  Patient will be D/C from 

hospital on Saturday.  Patient completes well when focusing on pacing, however 

patient continues to require VC.


Summary: Patient's care plan and long term goals have been reviewed and revised 

as necessary. Please see the Rehabilitation Signature page for all necessary 

signatures.